# Patient Record
Sex: FEMALE | Race: WHITE | NOT HISPANIC OR LATINO
[De-identification: names, ages, dates, MRNs, and addresses within clinical notes are randomized per-mention and may not be internally consistent; named-entity substitution may affect disease eponyms.]

---

## 2017-03-16 ENCOUNTER — APPOINTMENT (OUTPATIENT)
Dept: MRI IMAGING | Facility: IMAGING CENTER | Age: 42
End: 2017-03-16

## 2017-03-17 ENCOUNTER — APPOINTMENT (OUTPATIENT)
Dept: MRI IMAGING | Facility: CLINIC | Age: 42
End: 2017-03-17

## 2017-04-18 ENCOUNTER — OTHER (OUTPATIENT)
Age: 42
End: 2017-04-18

## 2018-03-28 ENCOUNTER — APPOINTMENT (OUTPATIENT)
Dept: INFECTIOUS DISEASE | Facility: CLINIC | Age: 43
End: 2018-03-28
Payer: MEDICARE

## 2018-03-28 ENCOUNTER — LABORATORY RESULT (OUTPATIENT)
Age: 43
End: 2018-03-28

## 2018-03-28 VITALS
BODY MASS INDEX: 48.82 KG/M2 | OXYGEN SATURATION: 98 % | HEIGHT: 65 IN | DIASTOLIC BLOOD PRESSURE: 90 MMHG | HEART RATE: 90 BPM | WEIGHT: 293 LBS | TEMPERATURE: 98 F | SYSTOLIC BLOOD PRESSURE: 158 MMHG

## 2018-03-28 DIAGNOSIS — Z20.2 CONTACT WITH AND (SUSPECTED) EXPOSURE TO INFECTIONS WITH A PREDOMINANTLY SEXUAL MODE OF TRANSMISSION: ICD-10-CM

## 2018-03-28 PROCEDURE — 99204 OFFICE O/P NEW MOD 45 MIN: CPT

## 2018-03-30 LAB
ALBUMIN SERPL ELPH-MCNC: 4.1 G/DL
ALP BLD-CCNC: 75 U/L
ALT SERPL-CCNC: 24 U/L
ANION GAP SERPL CALC-SCNC: 15 MMOL/L
AST SERPL-CCNC: 22 U/L
BILIRUB SERPL-MCNC: <0.2 MG/DL
BUN SERPL-MCNC: 10 MG/DL
C TRACH RRNA SPEC QL NAA+PROBE: NOT DETECTED
CALCIUM SERPL-MCNC: 9.4 MG/DL
CHLORIDE SERPL-SCNC: 100 MMOL/L
CO2 SERPL-SCNC: 22 MMOL/L
CREAT SERPL-MCNC: 0.65 MG/DL
GLUCOSE SERPL-MCNC: 91 MG/DL
HAV IGG+IGM SER QL: NONREACTIVE
HBV CORE IGG+IGM SER QL: NONREACTIVE
HBV SURFACE AB SER QL: NONREACTIVE
HBV SURFACE AG SER QL: NONREACTIVE
HCG SERPL-MCNC: <1 MIU/ML
HCV AB SER QL: NONREACTIVE
HCV S/CO RATIO: 0.12 S/CO
HIV1+2 AB SPEC QL IA.RAPID: NONREACTIVE
N GONORRHOEA RRNA SPEC QL NAA+PROBE: NOT DETECTED
POTASSIUM SERPL-SCNC: 4.2 MMOL/L
PROT SERPL-MCNC: 7.7 G/DL
SODIUM SERPL-SCNC: 137 MMOL/L
SOURCE AMPLIFICATION: NORMAL
T PALLIDUM AB SER QL IA: NEGATIVE

## 2018-04-12 ENCOUNTER — LABORATORY RESULT (OUTPATIENT)
Age: 43
End: 2018-04-12

## 2018-04-12 LAB
25(OH)D3 SERPL-MCNC: 14.6 NG/ML
APPEARANCE: ABNORMAL
BACTERIA: ABNORMAL
BASOPHILS # BLD AUTO: 0.1 K/UL
BASOPHILS NFR BLD AUTO: 0.9 %
BILIRUBIN URINE: NEGATIVE
BLOOD URINE: NEGATIVE
CALCIUM OXALATE CRYSTALS: ABNORMAL
COLOR: YELLOW
EOSINOPHIL # BLD AUTO: 0.2 K/UL
EOSINOPHIL NFR BLD AUTO: 1.8 %
GLUCOSE QUALITATIVE U: NEGATIVE MG/DL
HCT VFR BLD CALC: 32.5 %
HGB BLD-MCNC: 9.3 G/DL
HYALINE CASTS: 0 /LPF
KETONES URINE: NEGATIVE
LEUKOCYTE ESTERASE URINE: NEGATIVE
LYMPHOCYTES # BLD AUTO: 2.14 K/UL
LYMPHOCYTES NFR BLD AUTO: 19.3 %
MAN DIFF?: NORMAL
MCHC RBC-ENTMCNC: 20.5 PG
MCHC RBC-ENTMCNC: 28.6 GM/DL
MCV RBC AUTO: 71.6 FL
MICROSCOPIC-UA: NORMAL
MONOCYTES # BLD AUTO: 0.77 K/UL
MONOCYTES NFR BLD AUTO: 7 %
NEUTROPHILS # BLD AUTO: 7.86 K/UL
NEUTROPHILS NFR BLD AUTO: 71 %
NITRITE URINE: NEGATIVE
PH URINE: 5
PLATELET # BLD AUTO: 426 K/UL
PROTEIN URINE: NEGATIVE MG/DL
RBC # BLD: 4.54 M/UL
RBC # FLD: 18.6 %
RED BLOOD CELLS URINE: 2 /HPF
SPECIFIC GRAVITY URINE: 1.04
SQUAMOUS EPITHELIAL CELLS: >27 /HPF
UROBILINOGEN URINE: NEGATIVE MG/DL
WBC # FLD AUTO: 11.07 K/UL
WHITE BLOOD CELLS URINE: 74 /HPF

## 2018-04-24 ENCOUNTER — RX RENEWAL (OUTPATIENT)
Age: 43
End: 2018-04-24

## 2018-04-25 ENCOUNTER — LABORATORY RESULT (OUTPATIENT)
Age: 43
End: 2018-04-25

## 2018-04-25 ENCOUNTER — APPOINTMENT (OUTPATIENT)
Dept: INFECTIOUS DISEASE | Facility: CLINIC | Age: 43
End: 2018-04-25
Payer: MEDICARE

## 2018-04-25 VITALS
BODY MASS INDEX: 48.82 KG/M2 | OXYGEN SATURATION: 98 % | DIASTOLIC BLOOD PRESSURE: 96 MMHG | WEIGHT: 293 LBS | HEIGHT: 65 IN | SYSTOLIC BLOOD PRESSURE: 155 MMHG | HEART RATE: 96 BPM | TEMPERATURE: 98.8 F

## 2018-04-25 DIAGNOSIS — E55.9 VITAMIN D DEFICIENCY, UNSPECIFIED: ICD-10-CM

## 2018-04-25 DIAGNOSIS — Z23 ENCOUNTER FOR IMMUNIZATION: ICD-10-CM

## 2018-04-25 DIAGNOSIS — Z34.90 ENCOUNTER FOR SUPERVISION OF NORMAL PREGNANCY, UNSPECIFIED, UNSPECIFIED TRIMESTER: ICD-10-CM

## 2018-04-25 DIAGNOSIS — Z20.2 CONTACT WITH AND (SUSPECTED) EXPOSURE TO INFECTIONS WITH A PREDOMINANTLY SEXUAL MODE OF TRANSMISSION: ICD-10-CM

## 2018-04-25 DIAGNOSIS — Z20.828 CONTACT WITH AND (SUSPECTED) EXPOSURE TO OTHER VIRAL COMMUNICABLE DISEASES: ICD-10-CM

## 2018-04-25 DIAGNOSIS — Z87.898 PERSONAL HISTORY OF OTHER SPECIFIED CONDITIONS: ICD-10-CM

## 2018-04-25 DIAGNOSIS — R89.4 ABNORMAL IMMUNOLOGICAL FINDINGS IN SPECIMENS FROM OTHER ORGANS, SYSTEMS AND TISSUES: ICD-10-CM

## 2018-04-25 DIAGNOSIS — Z32.01 ENCOUNTER FOR PREGNANCY TEST, RESULT POSITIVE: ICD-10-CM

## 2018-04-25 DIAGNOSIS — Z29.9 ENCOUNTER FOR PROPHYLACTIC MEASURES, UNSPECIFIED: ICD-10-CM

## 2018-04-25 PROCEDURE — 90636 HEP A/HEP B VACC ADULT IM: CPT

## 2018-04-25 PROCEDURE — 99214 OFFICE O/P EST MOD 30 MIN: CPT | Mod: 25

## 2018-04-25 PROCEDURE — 90471 IMMUNIZATION ADMIN: CPT

## 2018-04-25 RX ORDER — RALTEGRAVIR 400 MG/1
400 TABLET, FILM COATED ORAL
Qty: 56 | Refills: 0 | Status: DISCONTINUED | COMMUNITY
Start: 2018-03-28 | End: 2018-04-25

## 2018-04-25 RX ORDER — ONDANSETRON 8 MG/1
8 TABLET, ORALLY DISINTEGRATING ORAL
Qty: 56 | Refills: 0 | Status: DISCONTINUED | COMMUNITY
Start: 2018-03-28 | End: 2018-04-25

## 2018-04-26 LAB
ALBUMIN SERPL ELPH-MCNC: 4 G/DL
ALP BLD-CCNC: 89 U/L
ALT SERPL-CCNC: 18 U/L
ANION GAP SERPL CALC-SCNC: 17 MMOL/L
APPEARANCE: CLEAR
AST SERPL-CCNC: 26 U/L
BILIRUB SERPL-MCNC: <0.2 MG/DL
BILIRUBIN URINE: NEGATIVE
BLOOD URINE: ABNORMAL
BUN SERPL-MCNC: 13 MG/DL
CALCIUM SERPL-MCNC: 9.9 MG/DL
CHLORIDE SERPL-SCNC: 100 MMOL/L
CO2 SERPL-SCNC: 24 MMOL/L
COLOR: YELLOW
CREAT SERPL-MCNC: 0.71 MG/DL
GLUCOSE QUALITATIVE U: NEGATIVE MG/DL
GLUCOSE SERPL-MCNC: 92 MG/DL
HIV1+2 AB SPEC QL IA.RAPID: NONREACTIVE
IRON SATN MFR SERPL: 5 %
IRON SERPL-MCNC: 18 UG/DL
KETONES URINE: NEGATIVE
LEUKOCYTE ESTERASE URINE: NEGATIVE
NITRITE URINE: NEGATIVE
PH URINE: 5.5
POTASSIUM SERPL-SCNC: 4.1 MMOL/L
PROT SERPL-MCNC: 8.1 G/DL
PROTEIN URINE: NEGATIVE MG/DL
SODIUM SERPL-SCNC: 141 MMOL/L
SPECIFIC GRAVITY URINE: 1.03
T PALLIDUM AB SER QL IA: NEGATIVE
TIBC SERPL-MCNC: 378 UG/DL
UIBC SERPL-MCNC: 360 UG/DL
UROBILINOGEN URINE: NEGATIVE MG/DL

## 2018-04-27 LAB
BASOPHILS # BLD AUTO: 0.04 K/UL
BASOPHILS NFR BLD AUTO: 0.3 %
C TRACH RRNA SPEC QL NAA+PROBE: NOT DETECTED
EOSINOPHIL # BLD AUTO: 0.23 K/UL
EOSINOPHIL NFR BLD AUTO: 1.8 %
HCT VFR BLD CALC: 32.8 %
HGB BLD-MCNC: 9.3 G/DL
IMM GRANULOCYTES NFR BLD AUTO: 0.4 %
LYMPHOCYTES # BLD AUTO: 2.66 K/UL
LYMPHOCYTES NFR BLD AUTO: 20.4 %
MAN DIFF?: NORMAL
MCHC RBC-ENTMCNC: 20.6 PG
MCHC RBC-ENTMCNC: 28.4 GM/DL
MCV RBC AUTO: 72.7 FL
MONOCYTES # BLD AUTO: 1.15 K/UL
MONOCYTES NFR BLD AUTO: 8.8 %
N GONORRHOEA RRNA SPEC QL NAA+PROBE: NOT DETECTED
NEUTROPHILS # BLD AUTO: 8.92 K/UL
NEUTROPHILS NFR BLD AUTO: 68.3 %
PLATELET # BLD AUTO: 475 K/UL
RBC # BLD: 4.51 M/UL
RBC # FLD: 17.5 %
SOURCE AMPLIFICATION: NORMAL
WBC # FLD AUTO: 13.05 K/UL

## 2018-05-02 ENCOUNTER — RX RENEWAL (OUTPATIENT)
Age: 43
End: 2018-05-02

## 2018-05-25 ENCOUNTER — APPOINTMENT (OUTPATIENT)
Dept: INFECTIOUS DISEASE | Facility: CLINIC | Age: 43
End: 2018-05-25

## 2018-06-11 ENCOUNTER — APPOINTMENT (OUTPATIENT)
Dept: OBGYN | Facility: CLINIC | Age: 43
End: 2018-06-11

## 2018-06-14 ENCOUNTER — MEDICATION RENEWAL (OUTPATIENT)
Age: 43
End: 2018-06-14

## 2018-07-24 ENCOUNTER — MEDICATION RENEWAL (OUTPATIENT)
Age: 43
End: 2018-07-24

## 2018-07-25 ENCOUNTER — MEDICATION RENEWAL (OUTPATIENT)
Age: 43
End: 2018-07-25

## 2018-07-25 ENCOUNTER — APPOINTMENT (OUTPATIENT)
Dept: INFECTIOUS DISEASE | Facility: CLINIC | Age: 43
End: 2018-07-25

## 2019-04-08 ENCOUNTER — EMERGENCY (EMERGENCY)
Facility: HOSPITAL | Age: 44
LOS: 1 days | Discharge: HOME | End: 2019-04-08
Attending: EMERGENCY MEDICINE | Admitting: EMERGENCY MEDICINE
Payer: MEDICARE

## 2019-04-08 VITALS
SYSTOLIC BLOOD PRESSURE: 167 MMHG | RESPIRATION RATE: 16 BRPM | OXYGEN SATURATION: 100 % | HEART RATE: 91 BPM | TEMPERATURE: 98 F | DIASTOLIC BLOOD PRESSURE: 92 MMHG

## 2019-04-08 PROCEDURE — 99283 EMERGENCY DEPT VISIT LOW MDM: CPT | Mod: 25

## 2019-04-08 NOTE — ED ADULT TRIAGE NOTE - CHIEF COMPLAINT QUOTE
pt with dx of cellulitis to right lower arm c/o increasing swelling and redness- pt on keflex x4 days and amoxicillin x2 days from PCP- denies any pain, fevers/chills, no decreased sensation to arm or pain- PMH tachycardia, appears in NAD

## 2019-04-09 RX ADMIN — Medication 100 MILLIGRAM(S): at 02:22

## 2019-04-09 NOTE — ED PROVIDER NOTE - PMH
Hypertension--diet and weight controlled  lost 15 pounds over 1.5 months  metorrhagia/Bleeding    Morbid Obesity    Murmur    personal h/o Fatty Liver documented on testing    personal h/o Iron Deficiency Anemia    Vertigo

## 2019-04-09 NOTE — ED ADULT NURSE NOTE - NSIMPLEMENTINTERV_GEN_ALL_ED
Implemented All Universal Safety Interventions:  [place] to call system. Call bell, personal items and telephone within reach. Instruct patient to call for assistance. Room bathroom lighting operational. Non-slip footwear when patient is off stretcher. Physically safe environment: no spills, clutter or unnecessary equipment. Stretcher in lowest position, wheels locked, appropriate side rails in place.

## 2019-04-09 NOTE — ED PROVIDER NOTE - NSFOLLOWUPINSTRUCTIONS_ED_ALL_ED_FT
Please follow up with your primary care doctor after you leave the emergency department so that they can follow up and conduct more testing and treatment as they deem necessary. If you have worsening signs or symptoms of what you came in to the Emergency Department today and are not able to see your doctor, go to your nearest emergency department or return to the [facility] emergency department for further care and management.

## 2019-04-09 NOTE — ED PROVIDER NOTE - OBJECTIVE STATEMENT
44 yo F with obesity, HTN, vertigo but no immunocompromised conditions that presents with left arm cellulitis. Pt has had symptoms x 4 days, started when she saw some lesions on her left arm, possible acne vs bug bites and she popped them, since has had erythema and warmth. Rash non tender, non itching. Took left over keflex that she had and started marking her arm, redness worse so went to PMD/NP and started on Amox x 2 days, not improving but taking as Rx'd. Also started on some cream given by NP. Denies systemic symptoms including fever, chills, N/V/D, chest pain, SOB, arm apin, paresthesias. Denies any IVDU. No known bug bites but possible per pt. Has never had this before. Otherwise eating and drinking well. LMP 1 wk ago.

## 2019-04-09 NOTE — ED ADULT NURSE NOTE - OBJECTIVE STATEMENT
received pt to intake aox3 in no apparent distress c/o cellulitis to left arm x3 days from possible bug bite or pimple. pt denies any other complaints including itchiness tenderness chest pain SOB fever chills. breaths equal unlabored VSS medicated as per order discharged

## 2019-04-09 NOTE — ED PROVIDER NOTE - CLINICAL SUMMARY MEDICAL DECISION MAKING FREE TEXT BOX
44 yo F with no immunocompromised conditions that presents with left arm cellulitis on exam x 4 days, taking PO Abx but not working per pt including leftover keflex which she has since stopped then started on PO Amox x 2 days per PMD/NP. Appears well and denies any systemic symptoms. Small area of concern. Possible shingles but unlikely but consider. Will start PO abx Doxycycline and f/u with PMD. Return precautions explained including start of systemic symptoms vs worsening of erythema or pain. Pt aware. VS WNL at this time.

## 2019-04-18 ENCOUNTER — OTHER (OUTPATIENT)
Age: 44
End: 2019-04-18

## 2019-04-18 ENCOUNTER — APPOINTMENT (OUTPATIENT)
Dept: GASTROENTEROLOGY | Facility: HOSPITAL | Age: 44
End: 2019-04-18

## 2019-04-18 ENCOUNTER — LABORATORY RESULT (OUTPATIENT)
Age: 44
End: 2019-04-18

## 2019-04-18 ENCOUNTER — OUTPATIENT (OUTPATIENT)
Dept: OUTPATIENT SERVICES | Facility: HOSPITAL | Age: 44
LOS: 1 days | End: 2019-04-18

## 2019-04-18 VITALS
HEART RATE: 92 BPM | BODY MASS INDEX: 48.82 KG/M2 | WEIGHT: 293 LBS | SYSTOLIC BLOOD PRESSURE: 135 MMHG | DIASTOLIC BLOOD PRESSURE: 64 MMHG | HEIGHT: 65 IN

## 2019-04-18 DIAGNOSIS — E66.01 MORBID (SEVERE) OBESITY DUE TO EXCESS CALORIES: ICD-10-CM

## 2019-04-18 DIAGNOSIS — R12 HEARTBURN: ICD-10-CM

## 2019-04-18 DIAGNOSIS — D50.9 IRON DEFICIENCY ANEMIA, UNSPECIFIED: ICD-10-CM

## 2019-04-18 LAB
ALBUMIN SERPL ELPH-MCNC: 4.2 G/DL — SIGNIFICANT CHANGE UP (ref 3.3–5)
ALP SERPL-CCNC: 83 U/L — SIGNIFICANT CHANGE UP (ref 40–120)
ALT FLD-CCNC: 30 U/L — SIGNIFICANT CHANGE UP (ref 4–33)
ANION GAP SERPL CALC-SCNC: 13 MMO/L — SIGNIFICANT CHANGE UP (ref 7–14)
AST SERPL-CCNC: 41 U/L — HIGH (ref 4–32)
BASOPHILS # BLD AUTO: 0.07 K/UL — SIGNIFICANT CHANGE UP (ref 0–0.2)
BASOPHILS NFR BLD AUTO: 0.7 % — SIGNIFICANT CHANGE UP (ref 0–2)
BILIRUB SERPL-MCNC: < 0.2 MG/DL — LOW (ref 0.2–1.2)
BUN SERPL-MCNC: 9 MG/DL — SIGNIFICANT CHANGE UP (ref 7–23)
CALCIUM SERPL-MCNC: 9.3 MG/DL — SIGNIFICANT CHANGE UP (ref 8.4–10.5)
CHLORIDE SERPL-SCNC: 100 MMOL/L — SIGNIFICANT CHANGE UP (ref 98–107)
CO2 SERPL-SCNC: 26 MMOL/L — SIGNIFICANT CHANGE UP (ref 22–31)
CREAT SERPL-MCNC: 0.62 MG/DL — SIGNIFICANT CHANGE UP (ref 0.5–1.3)
EOSINOPHIL # BLD AUTO: 0.19 K/UL — SIGNIFICANT CHANGE UP (ref 0–0.5)
EOSINOPHIL NFR BLD AUTO: 1.8 % — SIGNIFICANT CHANGE UP (ref 0–6)
FERRITIN SERPL-MCNC: 11.93 NG/ML — LOW (ref 15–150)
FOLATE SERPL-MCNC: 6.3 NG/ML — SIGNIFICANT CHANGE UP (ref 4.7–20)
GLUCOSE SERPL-MCNC: 93 MG/DL — SIGNIFICANT CHANGE UP (ref 70–99)
HBV SURFACE AG SER-ACNC: NEGATIVE — SIGNIFICANT CHANGE UP
HCT VFR BLD CALC: 35.4 % — SIGNIFICANT CHANGE UP (ref 34.5–45)
HGB BLD-MCNC: 10.3 G/DL — LOW (ref 11.5–15.5)
IGA FLD-MCNC: 420 MG/DL — HIGH (ref 70–400)
IMM GRANULOCYTES NFR BLD AUTO: 0.4 % — SIGNIFICANT CHANGE UP (ref 0–1.5)
IRON SATN MFR SERPL: 41 UG/DL — SIGNIFICANT CHANGE UP (ref 30–160)
IRON SATN MFR SERPL: 436 UG/DL — SIGNIFICANT CHANGE UP (ref 140–530)
LYMPHOCYTES # BLD AUTO: 2.23 K/UL — SIGNIFICANT CHANGE UP (ref 1–3.3)
LYMPHOCYTES # BLD AUTO: 21.3 % — SIGNIFICANT CHANGE UP (ref 13–44)
MCHC RBC-ENTMCNC: 22 PG — LOW (ref 27–34)
MCHC RBC-ENTMCNC: 29.1 % — LOW (ref 32–36)
MCV RBC AUTO: 75.5 FL — LOW (ref 80–100)
MONOCYTES # BLD AUTO: 0.7 K/UL — SIGNIFICANT CHANGE UP (ref 0–0.9)
MONOCYTES NFR BLD AUTO: 6.7 % — SIGNIFICANT CHANGE UP (ref 2–14)
NEUTROPHILS # BLD AUTO: 7.22 K/UL — SIGNIFICANT CHANGE UP (ref 1.8–7.4)
NEUTROPHILS NFR BLD AUTO: 69.1 % — SIGNIFICANT CHANGE UP (ref 43–77)
NRBC # FLD: 0 K/UL — SIGNIFICANT CHANGE UP (ref 0–0)
PLATELET # BLD AUTO: 332 K/UL — SIGNIFICANT CHANGE UP (ref 150–400)
PMV BLD: 10.6 FL — SIGNIFICANT CHANGE UP (ref 7–13)
POTASSIUM SERPL-MCNC: 4.1 MMOL/L — SIGNIFICANT CHANGE UP (ref 3.5–5.3)
POTASSIUM SERPL-SCNC: 4.1 MMOL/L — SIGNIFICANT CHANGE UP (ref 3.5–5.3)
PROT SERPL-MCNC: 8.1 G/DL — SIGNIFICANT CHANGE UP (ref 6–8.3)
RBC # BLD: 4.69 M/UL — SIGNIFICANT CHANGE UP (ref 3.8–5.2)
RBC # FLD: 17.5 % — HIGH (ref 10.3–14.5)
SODIUM SERPL-SCNC: 139 MMOL/L — SIGNIFICANT CHANGE UP (ref 135–145)
TSH SERPL-MCNC: 2.83 UIU/ML — SIGNIFICANT CHANGE UP (ref 0.27–4.2)
UIBC SERPL-MCNC: 395 UG/DL — HIGH (ref 110–370)
VIT B12 SERPL-MCNC: 370 PG/ML — SIGNIFICANT CHANGE UP (ref 200–900)
WBC # BLD: 10.45 K/UL — SIGNIFICANT CHANGE UP (ref 3.8–10.5)
WBC # FLD AUTO: 10.45 K/UL — SIGNIFICANT CHANGE UP (ref 3.8–10.5)

## 2019-04-18 NOTE — OB HISTORY
[Definite:  ___ (Date)] : the last menstrual period was [unfilled] [Normal Amount/Duration] : was of a normal amount and duration

## 2019-04-18 NOTE — REVIEW OF SYSTEMS
[Fever] : no fever [Chills] : no chills [Feeling Poorly] : not feeling poorly [Eye Pain] : no eye pain [Feeling Tired] : not feeling tired [Earache] : no earache [Heart Rate Is Slow] : the heart rate was not slow [Chest Pain] : no chest pain [Heart Rate Is Fast] : the heart rate was not fast [Palpitations] : no palpitations [Shortness Of Breath] : no shortness of breath [Cough] : no cough [Wheezing] : no wheezing [SOB on Exertion] : no shortness of breath during exertion [Vomiting] : no vomiting [Abdominal Pain] : no abdominal pain [Constipation] : no constipation [Diarrhea] : no diarrhea [Dysuria] : no dysuria [Arthralgias] : no arthralgias [Skin Lesions] : no skin lesions [Dizziness] : no dizziness [Anxiety] : no anxiety [Depression] : no depression [Proptosis] : no proptosis [Easy Bleeding] : no tendency for easy bleeding

## 2019-04-18 NOTE — ASSESSMENT
[FreeTextEntry1] : Impression:\par 1)Microcytic anemia- concern for iron deficiency anemia; patient is till currently menstruating but with drop in Hgb from 12 to 9 over the past 6 years. Concern for possible occult GI bleeding 2/2 underlying gastric or colonic malignancy, angiectasia, celiac disease; additionally could consider thyroid disorder, thalassemia, hereditary spherocytosis, anemia of inflammation. \par 2)Morbid Obesity (BMI 54)\par 3)Heartburn- suspect GERD in setting of morbid obesity; additional etiologies could include peptic ulcer disease, HP gastropathy, gastroparesis. \par \par Recommendations:\par -discussed role of endoscopic evaluation at this time to explore microcytic anemia given drop in Hgb over the last 6 years. The patient understands risks and benefits to upper endoscopy and colonoscopy in addition to potential capsule endoscopy, but is reluctant to undergo colonoscopy at this time. \par -will plan for EGD for now\par Risks, benefits, and alternatives described to patient in detail including but not limited to risks of bleeding, infection, perforation and missed lesions. The patient expresses full understanding and is agreeable to the procedure.\par -f/u blood work as ordered\par -check fecal occult blood test\par -given heartburn patient may benefit from RNY bypass as opposed to gastric sleeve given sleeve potentially worsens reflux. \par Discussed dietary and lifestyle modification with patient to promote weight loss\par -Anti-reflux diet discussed with patient.\par -return to clinic following procedures to discuss results

## 2019-04-18 NOTE — HISTORY OF PRESENT ILLNESS
[Heartburn] : stable heartburn [Vomiting] : denies vomiting [Nausea] : denies nausea [Constipation] : denies constipation [Diarrhea] : denies diarrhea [Abdominal Pain] : denies abdominal pain [Yellow Skin Or Eyes (Jaundice)] : denies jaundice [Abdominal Swelling] : denies abdominal swelling [de-identified] : 43F with morbid obesity (BMI 54), Vitamin D deficiency, presenting for initial evaluation. The patient is currently being evaluated by [facility] for bariatric surgery. She was recently diagnosed with NICOLÁS and is scheduled for receive CPAP machine at home. She denies any abdominal pain, nausea or vomiting. She notes intermittent heartburn related to eating certain foods, that is relieved with tums. She denies dysphagia, odynophagia, regurgitation or globus. She has regular BM's- brown in color, 1-2 times daily. She has not recent changes in stool frequency or caliber. She has never had a colonoscopy or upper endoscopy. She reports a history of "anemia" for which she was followed by a hematologist since she was young. She had blood work performed in 2012 which showed Hgb of 12, recent blood work showed Hgb of 9 with low MCV. She is currently on iron supplementation which she stopped 2 weeks ago in setting of starting amoxicillin for left arm abrasion. \par She is currently still menstruating; her last period started 2 days ago. She states her periods of regular without any excessive bleeding. \par \par HBsAg negative, anti-HBc negative, anti-HBs negative, HCV AB negative\par The patient received 2 of 3 Twinrix vaccinations 4/2018 and 5/2018. No documented vaccinations since then. \par RECENT LABS:\par 4/2018: Hgb 9.3, MCV 72, \par Iron 18, TIBC 378, Iron sat 5%

## 2019-04-18 NOTE — REASON FOR VISIT
[Initial Evaluation] : an initial evaluation [FreeTextEntry1] : Evaluation prior to bariatric surgery

## 2019-04-18 NOTE — PHYSICAL EXAM
[General Appearance - Alert] : alert [General Appearance - In No Acute Distress] : in no acute distress [Sclera] : the sclera and conjunctiva were normal [Examination Of The Oral Cavity] : the lips and gums were normal [Nasal Cavity] : the nasal mucosa and septum were normal [Outer Ear] : the ears and nose were normal in appearance [Oropharynx] : the oropharynx was normal [Neck Appearance] : the appearance of the neck was normal [Exaggerated Use Of Accessory Muscles For Inspiration] : no accessory muscle use [Heart Sounds] : normal S1 and S2 [Edema] : there was no peripheral edema [Abdomen Soft] : soft [Bowel Sounds] : normal bowel sounds [Abdomen Tenderness] : non-tender [Cervical Lymph Nodes Enlarged Posterior Bilaterally] : posterior cervical [Cervical Lymph Nodes Enlarged Anterior Bilaterally] : anterior cervical [Supraclavicular Lymph Nodes Enlarged Bilaterally] : supraclavicular [No CVA Tenderness] : no ~M costovertebral angle tenderness [Abnormal Walk] : normal gait [Skin Color & Pigmentation] : normal skin color and pigmentation [] : no rash [No Focal Deficits] : no focal deficits [Oriented To Time, Place, And Person] : oriented to person, place, and time [Affect] : the affect was normal [Impaired Insight] : insight and judgment were intact [Mood] : the mood was normal [FreeTextEntry1] : Morbid obesity

## 2019-04-19 LAB — SPECIMEN SOURCE: SIGNIFICANT CHANGE UP

## 2019-04-22 LAB
TTG IGA SER-ACNC: <1.2 U/ML — SIGNIFICANT CHANGE UP
TTG IGG SER-ACNC: 1.8 U/ML — SIGNIFICANT CHANGE UP

## 2019-04-23 LAB — BACTERIA BLD CULT: SIGNIFICANT CHANGE UP

## 2019-05-08 ENCOUNTER — RESULT REVIEW (OUTPATIENT)
Age: 44
End: 2019-05-08

## 2019-05-08 ENCOUNTER — OUTPATIENT (OUTPATIENT)
Dept: OUTPATIENT SERVICES | Facility: HOSPITAL | Age: 44
LOS: 1 days | Discharge: HOME | End: 2019-05-08
Payer: MEDICARE

## 2019-05-08 DIAGNOSIS — E66.01 MORBID (SEVERE) OBESITY DUE TO EXCESS CALORIES: ICD-10-CM

## 2019-05-08 LAB — HCG UR QL: NEGATIVE — SIGNIFICANT CHANGE UP

## 2019-05-08 PROCEDURE — 88312 SPECIAL STAINS GROUP 1: CPT | Mod: 26

## 2019-05-08 PROCEDURE — 43239 EGD BIOPSY SINGLE/MULTIPLE: CPT | Mod: GC

## 2019-05-08 PROCEDURE — 88305 TISSUE EXAM BY PATHOLOGIST: CPT | Mod: 26

## 2019-05-09 LAB — SURGICAL PATHOLOGY STUDY: SIGNIFICANT CHANGE UP

## 2019-05-13 DIAGNOSIS — A04.8 OTHER SPECIFIED BACTERIAL INTESTINAL INFECTIONS: ICD-10-CM

## 2019-05-16 ENCOUNTER — OTHER (OUTPATIENT)
Age: 44
End: 2019-05-16

## 2019-05-28 ENCOUNTER — OTHER (OUTPATIENT)
Age: 44
End: 2019-05-28

## 2019-05-30 ENCOUNTER — APPOINTMENT (OUTPATIENT)
Dept: GASTROENTEROLOGY | Facility: CLINIC | Age: 44
End: 2019-05-30

## 2019-05-30 ENCOUNTER — OUTPATIENT (OUTPATIENT)
Dept: OUTPATIENT SERVICES | Facility: HOSPITAL | Age: 44
LOS: 1 days | End: 2019-05-30

## 2019-05-30 VITALS
WEIGHT: 293 LBS | HEIGHT: 65 IN | OXYGEN SATURATION: 97 % | HEART RATE: 96 BPM | BODY MASS INDEX: 48.82 KG/M2 | SYSTOLIC BLOOD PRESSURE: 124 MMHG | DIASTOLIC BLOOD PRESSURE: 78 MMHG

## 2019-05-30 NOTE — PHYSICAL EXAM
[General Appearance - Alert] : alert [General Appearance - In No Acute Distress] : in no acute distress [Auscultation Breath Sounds / Voice Sounds] : lungs were clear to auscultation bilaterally [Bowel Sounds] : normal bowel sounds [Abdomen Soft] : soft [Abdomen Tenderness] : non-tender [Abdomen Mass (___ Cm)] : no abdominal mass palpated [Abnormal Walk] : normal gait [Nail Clubbing] : no clubbing  or cyanosis of the fingernails [Musculoskeletal - Swelling] : no joint swelling seen [Motor Tone] : muscle strength and tone were normal [Skin Color & Pigmentation] : normal skin color and pigmentation [Skin Turgor] : normal skin turgor [] : no rash [Occult Blood Positive] : stool was negative for occult blood

## 2019-05-30 NOTE — ASSESSMENT
[FreeTextEntry1] : Impression:\par 1)Microcytic anemia- concern for iron deficiency anemia; patient is till currently menstruating but with drop in Hgb from 12 to 9 over the past 6 years. Concern for possible occult GI bleeding 2/2 underlying g colonic malignancy, angiectasia,additionally could consider  thalassemia, hereditary spherocytosis.\par 2)Morbid Obesity (BMI 54)\par 3)Heartburn- suspect GERD in setting of morbid obesity\par 4) H Pylori - pending treatment\par \par Recommendations:\par - repeat CBC\par - recommended triple therapy for H. Pylori\par - Recommend stool Ag for H pylori in 2 months to evaluate for eradication\par - Recommend repeat upper endoscopy in 1 year to follow up on intestinal metaplasia \par - patient still does not want colonoscopy but this was discussed again\par -check fecal occult blood test\par -given heartburn patient may benefit from RNY bypass as opposed to gastric sleeve given sleeve potentially worsens reflux. \par -Discussed dietary and lifestyle modification with patient to promote weight loss\par -Anti-reflux diet discussed with patient.\par -return to clinic following procedures to discuss results.

## 2019-05-30 NOTE — HISTORY OF PRESENT ILLNESS
[de-identified] : 43F with morbid obesity (BMI 54), Vitamin D deficiency, presenting for follow up with visit with Dr. Li in April 2019 initial evaluation fpr GERD.  Of note the patient is currently being evaluated by [facility] for bariatric surgery. At her last visit with Dr. Li she denied any abdominal pain, nausea or vomiting. She noted intermittent heartburn related to eating certain foods, that is relieved with tums. She denied dysphagia, odynophagia, regurgitation or globus. \par \par She has regular BM's- brown in color, 1-2 times daily. She has not recent changes in stool frequency or caliber. She has never had a colonoscopy or upper endoscopy. She reports a history of "anemia" for which she was followed by a hematologist since she was young. She had blood work performed in 2012 which showed Hgb of 12, recent blood work showed Hgb of 9 with low MCV. She is currently on iron supplementation which she stopped 2 weeks ago in setting of starting amoxicillin for left arm abrasion. \par She is currently still menstruating states her periods of regular without any excessive bleeding. \par \par HBsAg negative, anti-HBc negative, anti-HBs negative, HCV AB negative\par The patient received 2 of 3 Twinrix vaccinations 4/2018 and 5/2018. No documented vaccinations since then.   \par \par After her last visit it was recommened she receive EGD/Colon but patient deferred colonoscopy.  EGD showed chronic Gastritis and waspositive for H.Pyori and was started on triple therapy on 5/8.  FOBT and blood work ordered: ttg negative, hep B surface negative, foalte/b12 normal, quantatative IgA slightly elevated. Ferritin low, iron bind capacity high, TSH normal, CMP normal, iron normal, hemoglobin 10. Patient did not take therapy because she was afraid of side effects but now is willing after discussion.\par \par RECENT LABS:\par 4/2018: Hgb 9.3, MCV 72, \par Iron 18, TIBC 378, Iron sat 5%. \par

## 2019-06-06 ENCOUNTER — OTHER (OUTPATIENT)
Age: 44
End: 2019-06-06

## 2019-06-11 ENCOUNTER — MOBILE ON CALL (OUTPATIENT)
Age: 44
End: 2019-06-11

## 2019-07-11 ENCOUNTER — OTHER (OUTPATIENT)
Age: 44
End: 2019-07-11

## 2019-07-12 ENCOUNTER — CLINICAL ADVICE (OUTPATIENT)
Age: 44
End: 2019-07-12

## 2019-08-29 ENCOUNTER — APPOINTMENT (OUTPATIENT)
Dept: PEDIATRIC ALLERGY IMMUNOLOGY | Facility: CLINIC | Age: 44
End: 2019-08-29
Payer: MEDICARE

## 2019-08-29 VITALS
HEIGHT: 65 IN | DIASTOLIC BLOOD PRESSURE: 82 MMHG | HEART RATE: 98 BPM | BODY MASS INDEX: 48.82 KG/M2 | SYSTOLIC BLOOD PRESSURE: 137 MMHG | WEIGHT: 293 LBS | OXYGEN SATURATION: 97 %

## 2019-08-29 DIAGNOSIS — L50.2 URTICARIA DUE TO COLD AND HEAT: ICD-10-CM

## 2019-08-29 DIAGNOSIS — T50.905A ADVERSE EFFECT OF UNSPECIFIED DRUGS, MEDICAMENTS AND BIOLOGICAL SUBSTANCES, INITIAL ENCOUNTER: ICD-10-CM

## 2019-08-29 DIAGNOSIS — Z01.89 ENCOUNTER FOR OTHER SPECIFIED SPECIAL EXAMINATIONS: ICD-10-CM

## 2019-08-29 PROCEDURE — 99204 OFFICE O/P NEW MOD 45 MIN: CPT

## 2019-08-29 RX ORDER — CLARITHROMYCIN 500 MG/1
500 TABLET, FILM COATED ORAL
Qty: 28 | Refills: 0 | Status: COMPLETED | COMMUNITY
Start: 2019-05-13 | End: 2019-07-24

## 2019-08-29 RX ORDER — TETRACYCLINE HYDROCHLORIDE 500 MG/1
500 CAPSULE ORAL EVERY 6 HOURS
Qty: 60 | Refills: 0 | Status: COMPLETED | COMMUNITY
Start: 2019-06-11 | End: 2019-07-29

## 2019-08-29 RX ORDER — OMEPRAZOLE 40 MG/1
40 CAPSULE, DELAYED RELEASE ORAL
Qty: 60 | Refills: 3 | Status: COMPLETED | COMMUNITY
Start: 2019-05-13 | End: 2019-07-24

## 2019-08-29 RX ORDER — BISMUTH SUBSALICYLATE 262 MG/1
262 TABLET, CHEWABLE ORAL 4 TIMES DAILY
Qty: 56 | Refills: 0 | Status: COMPLETED | COMMUNITY
Start: 2019-06-11 | End: 2019-07-24

## 2019-08-29 RX ORDER — MECLIZINE HYDROCHLORIDE 25 MG/1
25 TABLET ORAL
Qty: 7 | Refills: 0 | Status: COMPLETED | COMMUNITY
Start: 2017-06-21 | End: 2017-08-29

## 2019-08-29 RX ORDER — METRONIDAZOLE 250 MG/1
250 TABLET ORAL EVERY 6 HOURS
Qty: 60 | Refills: 0 | Status: COMPLETED | COMMUNITY
Start: 2019-06-11 | End: 2019-07-29

## 2019-08-29 RX ORDER — AMOXICILLIN 500 MG/1
500 CAPSULE ORAL TWICE DAILY
Qty: 56 | Refills: 0 | Status: COMPLETED | COMMUNITY
Start: 2019-05-13 | End: 2019-08-29

## 2019-08-29 NOTE — SOCIAL HISTORY
[Living Area] : in living area [Cat] : cat [FreeTextEntry2] : stay at home mom [Bedroom] : not in the bedroom [Smokers in Household] : there are no smokers in the home

## 2019-08-29 NOTE — CONSULT LETTER
[Dear  ___] : Dear  [unfilled], [Consult Letter:] : I had the pleasure of evaluating your patient, [unfilled]. [Please see my note below.] : Please see my note below. [Consult Closing:] : Thank you very much for allowing me to participate in the care of this patient.  If you have any questions, please do not hesitate to contact me. [Sincerely,] : Sincerely, [DrPamela  ___] : Dr. HENDERSON [FreeTextEntry2] : Dr. Anthony Plascencia [FreeTextEntry3] : Saniya Leon MD\par Attending Physician, Division of Allergy and Immunology\par , Departments of Medicine and Pediatrics\par [employer] [facility]\par Rachelle Chow [facility] \par [facility]

## 2019-08-29 NOTE — HISTORY OF PRESENT ILLNESS
[Asthma] : asthma [Allergic Rhinitis] : allergic rhinitis [Eczematous rashes] : eczematous rashes [Venom Reactions] : venom reactions [Food Allergies] : food allergies [de-identified] : 43 year old female presents with h/o urticaria and adverse drug reactions:\par \par Urticaria:\par Patient has noticed that she develops hives with exposure to cold, either during winter or with the AC on for the past 5 years. She also feels itchy with cold exposure. Last year after going swimming, she reportedly developed itching of the skin with red blotches, dizzy and had palpitations. She got out of the pool and her symptoms self resolved.\par Denies h/o urticaria without cold exposure.\par She reports that she also got red blotches after cold water from car wash splashed on her skin during last winter. No h/o joint pain/joint swelling or angioedema. \par She is also considering surgery for gastric bypass by Dr. Anthony Plascencia at [place].\par \par Adverse drug reactions:\par Although contrast media is listed as an allergy, patient reports that she had contrast media listed in her allergy list for preference reasons and anxiety for adverse reactions. She has never had contrast before.\par Codeine- h/o itchy skin 1999 post dental procedure. No associated symptoms of angioedema, hives, respiratory and/or gastrointestinal complaints. \par Dramamine- felt "jumpy", senses were heightened.\par Band-aid - h/o skin redness lasting 2 weeks. No reaction with blowing balloons or wearing latex gloves.

## 2019-08-29 NOTE — PHYSICAL EXAM
[Alert] : alert [Well Nourished] : well nourished [Healthy Appearance] : healthy appearance [No Acute Distress] : no acute distress [Well Developed] : well developed [Normal Pupil & Iris Size/Symmetry] : normal pupil and iris size and symmetry [No Discharge] : no discharge [No Photophobia] : no photophobia [Sclera Not Icteric] : sclera not icteric [Normal TMs] : both tympanic membranes were normal [Normal Nasal Mucosa] : the nasal mucosa was normal [Normal Lips/Tongue] : the lips and tongue were normal [Normal Outer Ear/Nose] : the ears and nose were normal in appearance [Normal Tonsils] : normal tonsils [No Thrush] : no thrush [Normal Dentition] : normal dentition [No Oral Lesions or Ulcers] : no oral lesions or ulcers [No Neck Mass] : no neck mass was observed [No LAD] : no lymphadenopathy [Supple] : the neck was supple [Normal Rate and Effort] : normal respiratory rhythm and effort [No Crackles] : no crackles [No Retractions] : no retractions [Bilateral Audible Breath Sounds] : bilateral audible breath sounds [Normal Rate] : heart rate was normal  [Normal S1, S2] : normal S1 and S2 [No murmur] : no murmur [Regular Rhythm] : with a regular rhythm [Soft] : abdomen soft [Not Distended] : not distended [Normal Cervical Lymph Nodes] : cervical [Skin Intact] : skin intact  [No Rash] : no rash [No Skin Lesions] : no skin lesions [No Cyanosis] : no cyanosis [Normal Mood] : mood was normal [Normal Affect] : affect was normal [Alert, Awake, Oriented as Age-Appropriate] : alert, awake, oriented as age appropriate [Conjunctival Erythema] : no conjunctival erythema [Boggy Nasal Turbinates] : no boggy and/or pale nasal turbinates [Pharyngeal erythema] : no pharyngeal erythema [Exudate] : no exudate [Posterior Pharyngeal Cobblestoning] : no posterior pharyngeal cobblestoning [Clear Rhinorrhea] : no clear rhinorrhea was seen [Wheezing] : no wheezing was heard [Eczematous Patches] : no eczematous patches [Xerosis] : no xerosis

## 2019-08-29 NOTE — REASON FOR VISIT
[Initial Consultation] : an initial consultation for [Parent] : parent [FreeTextEntry2] : cold induced urticaria

## 2019-09-05 ENCOUNTER — APPOINTMENT (OUTPATIENT)
Dept: GASTROENTEROLOGY | Facility: CLINIC | Age: 44
End: 2019-09-05

## 2020-03-19 ENCOUNTER — APPOINTMENT (OUTPATIENT)
Dept: CARDIOLOGY | Facility: CLINIC | Age: 45
End: 2020-03-19

## 2020-03-21 ENCOUNTER — TRANSCRIPTION ENCOUNTER (OUTPATIENT)
Age: 45
End: 2020-03-21

## 2020-03-23 ENCOUNTER — APPOINTMENT (OUTPATIENT)
Dept: OBGYN | Facility: CLINIC | Age: 45
End: 2020-03-23

## 2020-10-08 ENCOUNTER — APPOINTMENT (OUTPATIENT)
Dept: OBGYN | Facility: CLINIC | Age: 45
End: 2020-10-08
Payer: MEDICARE

## 2020-10-08 ENCOUNTER — APPOINTMENT (OUTPATIENT)
Age: 45
End: 2020-10-08
Payer: MEDICARE

## 2020-10-08 ENCOUNTER — ASOB RESULT (OUTPATIENT)
Age: 45
End: 2020-10-08

## 2020-10-08 VITALS
DIASTOLIC BLOOD PRESSURE: 87 MMHG | TEMPERATURE: 97.4 F | HEART RATE: 89 BPM | BODY MASS INDEX: 48.82 KG/M2 | SYSTOLIC BLOOD PRESSURE: 151 MMHG | HEIGHT: 65 IN | WEIGHT: 293 LBS

## 2020-10-08 LAB
HCG UR QL: NEGATIVE
QUALITY CONTROL: YES

## 2020-10-08 PROCEDURE — 99203 OFFICE O/P NEW LOW 30 MIN: CPT

## 2020-10-08 PROCEDURE — 76830 TRANSVAGINAL US NON-OB: CPT

## 2020-10-08 NOTE — REVIEW OF SYSTEMS
[Fever] : no fever [Chills] : no chills [Fatigue] : no fatigue [Poor Appetite] : appetite not poor [Night Sweats] : no night sweats [Dry Eyes] : no dry eyes [Eye Discharge] : no eye discharge [Sight Problems] : no sight problems [Dec Hearing] : no decreased hearing [Nasal Discharge] : no nasal discharge [Sore Throat] : no sore throat [Dyspnea] : no dyspnea [Cough] : no cough [SOB on Exertion] : no shortness of breath on exertion [Chest Pain] : no chest pain [Palpitations] : no palpitations [Lower Ext Edema] : no lower extremity edema [Abdominal Pain] : no abdominal pain [Constipation] : no constipation [Diarrhea] : diarrhea [Vomiting] : no vomiting [Melena] : no melena [Bloating] : no bloating [Urgency] : no urgency [Frequency] : no frequency [Incontinence] : no incontinence [Urethral Discharge] : no urethral discharge [Abn Vaginal bleeding] : no abnormal vaginal bleeding [CVA Pain] : no CVA pain [Genital Rash/Irritation] : no genital rash/irritation [Breast Pain] : no breast pain [Breast Lump] : no breast lump [Nipple Changes] : no nipple changes [Skin Lesion on Breast] : no skin lesion on breast [Skin Rash] : no skin rash [Mole Changes] : no mole changes [Arthralgias] : no arthralgias [Myalgias] : no myalgias [Back Pain] : no back pain [Headache] : no headache [Dizziness] : no dizziness [Fainting] : no fainting [Anxiety] : no anxiety [Depression] : no depression [Sleep Disturbances] : no sleep disturbances [PMS/PMDD Symptoms] : no PMS/PMDD symptoms [Deepening Voice] : no deepening voice [Hot Flashes] : no hot flashes [Easy Bleeding] : no easy bleeding [Easy Bruising] : no easy bruising [Swollen Glands] : no swollen glands [Nose Bleeds] : no nose bleeds

## 2020-10-13 LAB
C TRACH RRNA SPEC QL NAA+PROBE: NOT DETECTED
CYTOLOGY CVX/VAG DOC THIN PREP: ABNORMAL
FSH SERPL-MCNC: 6.2 IU/L
HBV SURFACE AG SER QL: NONREACTIVE
HIV1+2 AB SPEC QL IA.RAPID: NONREACTIVE
HPV HIGH+LOW RISK DNA PNL CVX: NOT DETECTED
N GONORRHOEA RRNA SPEC QL NAA+PROBE: NOT DETECTED
PROLACTIN SERPL-MCNC: 7.4 NG/ML
SOURCE AMPLIFICATION: NORMAL
T PALLIDUM AB SER QL IA: NEGATIVE
TSH SERPL-ACNC: 3.56 UIU/ML

## 2020-11-23 ENCOUNTER — APPOINTMENT (OUTPATIENT)
Age: 45
End: 2020-11-23

## 2020-12-03 RX ORDER — METRONIDAZOLE 500 MG/1
500 TABLET ORAL
Qty: 4 | Refills: 0 | Status: DISCONTINUED | COMMUNITY
Start: 2020-10-13 | End: 2020-12-03

## 2020-12-03 RX ORDER — MEDROXYPROGESTERONE ACETATE 10 MG/1
10 TABLET ORAL DAILY
Qty: 10 | Refills: 0 | Status: DISCONTINUED | COMMUNITY
Start: 2020-10-08 | End: 2020-12-03

## 2020-12-03 RX ORDER — CETIRIZINE HYDROCHLORIDE 10 MG/1
10 TABLET, COATED ORAL
Qty: 1 | Refills: 2 | Status: DISCONTINUED | COMMUNITY
Start: 2019-08-29 | End: 2020-12-03

## 2020-12-03 RX ORDER — EMTRICITABINE AND TENOFOVIR DISOPROXIL FUMARATE 200; 300 MG/1; MG/1
200-300 TABLET, FILM COATED ORAL
Qty: 30 | Refills: 0 | Status: DISCONTINUED | COMMUNITY
Start: 2018-03-28 | End: 2020-12-03

## 2020-12-10 ENCOUNTER — NON-APPOINTMENT (OUTPATIENT)
Age: 45
End: 2020-12-10

## 2020-12-10 ENCOUNTER — LABORATORY RESULT (OUTPATIENT)
Age: 45
End: 2020-12-10

## 2020-12-10 ENCOUNTER — APPOINTMENT (OUTPATIENT)
Dept: CARDIOLOGY | Facility: CLINIC | Age: 45
End: 2020-12-10
Payer: MEDICARE

## 2020-12-10 VITALS
WEIGHT: 293 LBS | DIASTOLIC BLOOD PRESSURE: 83 MMHG | HEIGHT: 65 IN | OXYGEN SATURATION: 99 % | SYSTOLIC BLOOD PRESSURE: 120 MMHG | TEMPERATURE: 98.1 F | HEART RATE: 80 BPM | BODY MASS INDEX: 48.82 KG/M2

## 2020-12-10 PROCEDURE — 99072 ADDL SUPL MATRL&STAF TM PHE: CPT

## 2020-12-10 PROCEDURE — 99205 OFFICE O/P NEW HI 60 MIN: CPT

## 2020-12-10 PROCEDURE — 93000 ELECTROCARDIOGRAM COMPLETE: CPT

## 2020-12-11 LAB — 25(OH)D3 SERPL-MCNC: 17.7 NG/ML

## 2020-12-30 ENCOUNTER — NON-APPOINTMENT (OUTPATIENT)
Age: 45
End: 2020-12-30

## 2021-01-04 ENCOUNTER — TRANSCRIPTION ENCOUNTER (OUTPATIENT)
Age: 46
End: 2021-01-04

## 2021-01-04 ENCOUNTER — NON-APPOINTMENT (OUTPATIENT)
Age: 46
End: 2021-01-04

## 2021-01-05 ENCOUNTER — NON-APPOINTMENT (OUTPATIENT)
Age: 46
End: 2021-01-05

## 2021-01-14 ENCOUNTER — EMERGENCY (EMERGENCY)
Facility: HOSPITAL | Age: 46
LOS: 1 days | Discharge: HOME | End: 2021-01-14
Attending: EMERGENCY MEDICINE | Admitting: EMERGENCY MEDICINE
Payer: MEDICAID

## 2021-01-14 VITALS
TEMPERATURE: 98 F | RESPIRATION RATE: 16 BRPM | OXYGEN SATURATION: 100 % | HEART RATE: 95 BPM | DIASTOLIC BLOOD PRESSURE: 108 MMHG | SYSTOLIC BLOOD PRESSURE: 166 MMHG

## 2021-01-14 VITALS
HEIGHT: 65 IN | RESPIRATION RATE: 16 BRPM | SYSTOLIC BLOOD PRESSURE: 151 MMHG | DIASTOLIC BLOOD PRESSURE: 82 MMHG | HEART RATE: 110 BPM | OXYGEN SATURATION: 100 %

## 2021-01-14 PROCEDURE — 99283 EMERGENCY DEPT VISIT LOW MDM: CPT

## 2021-01-14 RX ORDER — ACETAMINOPHEN 500 MG
975 TABLET ORAL ONCE
Refills: 0 | Status: COMPLETED | OUTPATIENT
Start: 2021-01-14 | End: 2021-01-14

## 2021-01-14 RX ORDER — CEPHALEXIN 500 MG
500 CAPSULE ORAL ONCE
Refills: 0 | Status: COMPLETED | OUTPATIENT
Start: 2021-01-14 | End: 2021-01-14

## 2021-01-14 RX ORDER — CEPHALEXIN 500 MG
1 CAPSULE ORAL
Qty: 20 | Refills: 0
Start: 2021-01-14 | End: 2021-01-18

## 2021-01-14 RX ADMIN — Medication 975 MILLIGRAM(S): at 21:41

## 2021-01-14 RX ADMIN — Medication 500 MILLIGRAM(S): at 21:41

## 2021-01-14 NOTE — ED PROVIDER NOTE - PATIENT PORTAL LINK FT
You can access the FollowMyHealth Patient Portal offered by [facility] by registering at the following website: [email or web address]. By joining [employer]’s FollowMyHealth portal, you will also be able to view your health information using other applications (apps) compatible with our system.

## 2021-01-14 NOTE — ED PROVIDER NOTE - OBJECTIVE STATEMENT
Attending/Hung: 44 yo F w/ h/o HTN, morbid obesity, p/w RUE erythema. Pt repots ~ 5 days ago of Rt hand 2nd finger paronychia which was drained at an urgent care center. She noted some nonpruritic erythema. Two days later started on Abx with noted improvement of erythema (Keflex 500 mg BID). Pt denies fever/chills, weakness or lightheadedness.

## 2021-01-14 NOTE — ED ADULT NURSE NOTE - OBJECTIVE STATEMENT
Pt presents to intake 10c, alert&orientedx4, ambulatory, pmhx HTN and obesity, coming to ED for evaluation of redness and swelling from right pointer finger to right arm above the elbow x5days. Pt presented with redness to the arm, states its been spreading. Pt denies fever, trauma or other injuries. Medications given as ordered. NAD noted.

## 2021-01-14 NOTE — ED PROVIDER NOTE - NSFOLLOWUPINSTRUCTIONS_ED_ALL_ED_FT
- Continue all regular medications  - For pain, take tylenol or ibuprofen as directed on the packaging  - Take cephalexin as prescribed for the entire course even if your symptoms have resolved  - Follow up with your primary doctor next week  - Return to the ER for any worsening symptoms or concerns

## 2021-01-14 NOTE — ED PROVIDER NOTE - PHYSICAL EXAMINATION
Attending/Hung: Well-appearing, PERR/EOMI, supple, RRR, CTAB, Abd-soft, NT/ND; RUE: 2nd finger-area of paronychia-min swelling, no drainage, min erythema; UE areas of light erythema somewhat blanching (within drawn lines from two days ago); no axillary LV, no PT, no swelling; A&Ox3, nonfocal

## 2021-03-30 ENCOUNTER — APPOINTMENT (OUTPATIENT)
Dept: CARDIOLOGY | Facility: CLINIC | Age: 46
End: 2021-03-30
Payer: MEDICARE

## 2021-03-30 PROCEDURE — 99214 OFFICE O/P EST MOD 30 MIN: CPT | Mod: 95

## 2021-04-02 NOTE — REASON FOR VISIT
[FreeTextEntry1] : RAFAEL ALVAREZ presents for cardiovascular/vascular medicine evaluation.  She was last seen by me in June 2013.  \par \par She is a 46 yo woman with morbid obesity (BMI 58-59), hyperlipidemia, hypertension, and dizziness/gait instability.  She has recently noted progressive symptoms of exertional dyspnea and was advised by her PMD to see a cardiologist.  No other active cardiac complaints.\par \par 12-lead ECG notes NSR 80 bpm, LAD, normal intervals.  Physical examination notable for BMI 58-59, mild ankle edema.  \par \par Eocardiogram to re-evaluate cardiac structure/function.

## 2021-04-05 ENCOUNTER — APPOINTMENT (OUTPATIENT)
Dept: SURGERY | Facility: CLINIC | Age: 46
End: 2021-04-05
Payer: MEDICARE

## 2021-04-05 VITALS
HEART RATE: 90 BPM | HEIGHT: 65 IN | TEMPERATURE: 97.6 F | BODY MASS INDEX: 48.82 KG/M2 | WEIGHT: 293 LBS | RESPIRATION RATE: 17 BRPM | OXYGEN SATURATION: 98 % | DIASTOLIC BLOOD PRESSURE: 81 MMHG | SYSTOLIC BLOOD PRESSURE: 136 MMHG

## 2021-04-05 DIAGNOSIS — O09.40 SUPERVISION OF PREGNANCY WITH GRAND MULTIPARITY, UNSPECIFIED TRIMESTER: ICD-10-CM

## 2021-04-05 DIAGNOSIS — O20.0 THREATENED ABORTION: ICD-10-CM

## 2021-04-05 DIAGNOSIS — O03.9 COMPLETE OR UNSPECIFIED SPONTANEOUS ABORTION W/OUT COMPLICATION: ICD-10-CM

## 2021-04-05 DIAGNOSIS — O09.299 SUPERVISION OF PREGNANCY WITH OTHER POOR REPRODUCTIVE OR OBSTETRIC HISTORY, UNSPECIFIED TRIMESTER: ICD-10-CM

## 2021-04-05 DIAGNOSIS — O09.529 SUPERVISION OF ELDERLY MULTIGRAVIDA, UNSPECIFIED TRIMESTER: ICD-10-CM

## 2021-04-05 DIAGNOSIS — Z12.4 ENCOUNTER FOR SCREENING FOR MALIGNANT NEOPLASM OF CERVIX: ICD-10-CM

## 2021-04-05 PROCEDURE — 99205 OFFICE O/P NEW HI 60 MIN: CPT

## 2021-04-05 PROCEDURE — 99072 ADDL SUPL MATRL&STAF TM PHE: CPT

## 2021-04-05 NOTE — PHYSICAL EXAM
[Obese, well nourished, in no acute distress] : obese, well nourished, in no acute distress [Normal] : affect appropriate [de-identified] : Nl respirations [de-identified] : soft, NT, ND

## 2021-04-05 NOTE — HISTORY OF PRESENT ILLNESS
[de-identified] : The patient is a 45 year old female here for a weight loss consultation visit. \par \par The patient has been struggling with severe obesity for many years.  In particular, she has a sweet tooth and feels she is addicted to sugars.  She was actually scheduled to undergo gastric bypass surgery at [facility], however was very anxious about it and actually canceled the procedure on the table.  She has since tried to lose weight on her own, and has attempted several diet and exercise programs without success.  The excess weight is starting to significantly affect her health and lifestyle.  She has hypertension and prediabetes and is worried about having to take medications for the rest of her life.  She is ready for surgery.\par \par \par Medical history is significant for hypertension, hyperlipidemia, prediabetes.\par Surgical history is significant for D&C.\par The patient denies prior history of blood clot or abnormal bleeding.\par The patient denies prior history of dysphagia or reflux.\par \par Referred by [default value].
Patient's belongings returned

## 2021-04-05 NOTE — ASSESSMENT
[FreeTextEntry1] : 45-year-old female with longstanding history of morbid obesity (height 5 feet 5 inches, weight 338 pounds, BMI 56) with comorbidities of hypertension, hyperlipidemia, and prediabetes.\par \par The patient has failed multiple prior attempts at weight loss and is now dealing with the side effects, risk factors, and poor quality of life associated with morbid obesity.  They would benefit from surgical weight loss as outlined in the NIH and  ASMBS consensus statements on bariatric surgery.  Surgical intervention is medically indicated.\par \par My impression is that the patient is a reasonable candidate for laparoscopic Fiona-en-Y gastric bypass, particularly given her predilection for sweets.\par

## 2021-04-06 ENCOUNTER — OUTPATIENT (OUTPATIENT)
Dept: OUTPATIENT SERVICES | Facility: HOSPITAL | Age: 46
LOS: 1 days | End: 2021-04-06

## 2021-04-06 ENCOUNTER — APPOINTMENT (OUTPATIENT)
Dept: CV DIAGNOSITCS | Facility: HOSPITAL | Age: 46
End: 2021-04-06
Payer: MEDICARE

## 2021-04-06 DIAGNOSIS — R06.00 DYSPNEA, UNSPECIFIED: ICD-10-CM

## 2021-04-06 PROCEDURE — 93306 TTE W/DOPPLER COMPLETE: CPT | Mod: 26

## 2021-04-14 ENCOUNTER — APPOINTMENT (OUTPATIENT)
Dept: CARDIOLOGY | Facility: CLINIC | Age: 46
End: 2021-04-14
Payer: MEDICARE

## 2021-04-14 ENCOUNTER — NON-APPOINTMENT (OUTPATIENT)
Age: 46
End: 2021-04-14

## 2021-04-14 VITALS
OXYGEN SATURATION: 98 % | BODY MASS INDEX: 50.02 KG/M2 | HEART RATE: 80 BPM | WEIGHT: 293 LBS | SYSTOLIC BLOOD PRESSURE: 142 MMHG | RESPIRATION RATE: 16 BRPM | TEMPERATURE: 98.3 F | HEIGHT: 64 IN | DIASTOLIC BLOOD PRESSURE: 85 MMHG

## 2021-04-14 DIAGNOSIS — R06.02 SHORTNESS OF BREATH: ICD-10-CM

## 2021-04-14 DIAGNOSIS — Z13.6 ENCOUNTER FOR SCREENING FOR CARDIOVASCULAR DISORDERS: ICD-10-CM

## 2021-04-14 LAB
BASOPHILS # BLD AUTO: 0.08 K/UL
BASOPHILS NFR BLD AUTO: 1 %
EOSINOPHIL # BLD AUTO: 0.14 K/UL
EOSINOPHIL NFR BLD AUTO: 1.8 %
HCT VFR BLD CALC: 42.7 %
HGB BLD-MCNC: 13.2 G/DL
IMM GRANULOCYTES NFR BLD AUTO: 0.4 %
LYMPHOCYTES # BLD AUTO: 2.2 K/UL
LYMPHOCYTES NFR BLD AUTO: 28.3 %
MAN DIFF?: NORMAL
MCHC RBC-ENTMCNC: 26.9 PG
MCHC RBC-ENTMCNC: 30.9 GM/DL
MCV RBC AUTO: 87.1 FL
MONOCYTES # BLD AUTO: 0.77 K/UL
MONOCYTES NFR BLD AUTO: 9.9 %
NEUTROPHILS # BLD AUTO: 4.56 K/UL
NEUTROPHILS NFR BLD AUTO: 58.6 %
PLATELET # BLD AUTO: 336 K/UL
RBC # BLD: 4.9 M/UL
RBC # FLD: 13.4 %
WBC # FLD AUTO: 7.78 K/UL

## 2021-04-14 PROCEDURE — 93000 ELECTROCARDIOGRAM COMPLETE: CPT

## 2021-04-14 PROCEDURE — 99214 OFFICE O/P EST MOD 30 MIN: CPT

## 2021-04-14 PROCEDURE — 99072 ADDL SUPL MATRL&STAF TM PHE: CPT

## 2021-04-16 LAB
25(OH)D3 SERPL-MCNC: 12.9 NG/ML
ALBUMIN SERPL ELPH-MCNC: 4.5 G/DL
ALP BLD-CCNC: 82 U/L
ALT SERPL-CCNC: 59 U/L
ANION GAP SERPL CALC-SCNC: 13 MMOL/L
APTT BLD: 32.8 SEC
AST SERPL-CCNC: 64 U/L
BILIRUB SERPL-MCNC: 0.2 MG/DL
BUN SERPL-MCNC: 11 MG/DL
CALCIUM SERPL-MCNC: 10 MG/DL
CHLORIDE SERPL-SCNC: 105 MMOL/L
CO2 SERPL-SCNC: 24 MMOL/L
CREAT SERPL-MCNC: 0.68 MG/DL
ESTIMATED AVERAGE GLUCOSE: 126 MG/DL
FOLATE SERPL-MCNC: 9.1 NG/ML
GLUCOSE SERPL-MCNC: 92 MG/DL
HBA1C MFR BLD HPLC: 6 %
HCG SERPL QL: NEGATIVE
INR PPP: 1.04 RATIO
IRON SERPL-MCNC: 45 UG/DL
PAPP-A SERPL-ACNC: <1 MIU/ML
POTASSIUM SERPL-SCNC: 4.9 MMOL/L
PROT SERPL-MCNC: 7.9 G/DL
PT BLD: 12.3 SEC
SODIUM SERPL-SCNC: 142 MMOL/L
TSH SERPL-ACNC: 2.75 UIU/ML
VIT B12 SERPL-MCNC: 478 PG/ML

## 2021-04-23 LAB — VIT B1 SERPL-MCNC: 120.3 NMOL/L

## 2021-04-24 PROBLEM — R06.02 SOB (SHORTNESS OF BREATH) ON EXERTION: Status: ACTIVE | Noted: 2020-12-03

## 2021-04-24 PROBLEM — Z13.6 SCREENING FOR CARDIOVASCULAR CONDITION: Status: ACTIVE | Noted: 2021-04-24

## 2021-04-28 NOTE — ED PROVIDER NOTE - CROS ED ROS STATEMENT
Your current Orthopaedic problem we are working together to treat is: right hip pain.    PHYSICAL THERAPY/OCCUPATIONAL THERAPY  Physical Therapy  will help your recovery. Please call to schedule your therapy appointments at [facility], [phone]. It would be advisable to follow up with me upon completion of your therapy.     IMAGING:  You have been recommended to undergo an MRI of your hip to help us better understand and treat your symptoms. Please stop at reception to schedule the imaging appointment or call [facility] [place] for Imaging - [phone] ([facility] sites & [facility] sites). Please call our office at least  2 day after your testing is completed to discuss results and follow up appointment.    It is recommended you schedule a follow-up appointment with Ben Donovan MD after your MRI.    Office hours are 8:00 am to 5:00 pm Monday through Friday. If it is urgent that you speak with someone outside of these hours, our [facility] [place][note 2] Call Center will be able to assist you. You can reach the office by calling the:    [address] [place], [place] [address] [phone]    We do highly recommend [employer] [facility], if you do not already have this. You can request access via the internet or by simply talking with a  at any of the clinics.   [email or web address].    You may receive a survey in the mail from [facility][note 2]. They mail and process patient satisfaction surveys for our clinic. Should you receive a survey, please take a few minutes to rate your experience with your visit.  We value your opinions and insights. Thank you in advance for your time and interest in responding.    Thank you for choosing [facility] [place]note 2] as your Orthopaedic provider!   all other ROS negative except as per HPI

## 2021-05-03 ENCOUNTER — APPOINTMENT (OUTPATIENT)
Dept: SURGERY | Facility: CLINIC | Age: 46
End: 2021-05-03

## 2021-07-27 NOTE — ED ADULT NURSE NOTE - FINAL NURSING ELECTRONIC SIGNATURE
Pt will need a refill on clopidogrel pt says she thought she had some at her pharmacy so she called them to see if she did and they told her there were no fills pt has appt 8-10 09-Apr-2019 02:36

## 2022-02-01 NOTE — PLAN
Medicare Wellness Visit, Male    The best way to live healthy is to have a lifestyle where you eat a well-balanced diet, exercise regularly, limit alcohol use, and quit all forms of tobacco/nicotine, if applicable. Regular preventive services are another way to keep healthy. Preventive services (vaccines, screening tests, monitoring & exams) can help personalize your care plan, which helps you manage your own care. Screening tests can find health problems at the earliest stages, when they are easiest to treat. [facility] follows the current, evidence-based guidelines published by the [facility] ([facility]) when recommending preventive services for our patients. Because we follow these guidelines, sometimes recommendations change over time as research supports it. (For example, a prostate screening blood test is no longer routinely recommended for men with no symptoms). Of course, you and your doctor may decide to screen more often for some diseases, based on your risk and co-morbidities (chronic disease you are already diagnosed with). Preventive services for you include:  - Medicare offers their members a free annual wellness visit, which is time for you and your primary care provider to discuss and plan for your preventive service needs. Take advantage of this benefit every year!  -All adults over age 72 should receive the recommended pneumonia vaccines. Current USPSTF guidelines recommend a series of two vaccines for the best pneumonia protection.   -All adults should have a flu vaccine yearly and tetanus vaccine every 10 years.  -All adults age 48 and older should receive the shingles vaccines (series of two vaccines).        -All adults age 38-68 who are overweight should have a diabetes screening test once every three years.   -Other screening tests & preventive services for persons with diabetes include: an eye exam to screen for diabetic retinopathy, a kidney function test, a foot exam, and stricter control over your cholesterol.   -Cardiovascular screening for adults with routine risk involves an electrocardiogram (ECG) at intervals determined by the provider.   -Colorectal cancer screening should be done for adults age 54-65 with no increased risk factors for colorectal cancer. There are a number of acceptable methods of screening for this type of cancer. Each test has its own benefits and drawbacks. Discuss with your provider what is most appropriate for you during your annual wellness visit. The different tests include: colonoscopy (considered the best screening method), a fecal occult blood test, a fecal DNA test, and sigmoidoscopy.  -All adults born between [facility] should be screened once for Hepatitis C.  -An Abdominal Aortic Aneurysm (AAA) Screening is recommended for men age 73-68 who has ever smoked in their lifetime. Here is a list of your current Health Maintenance items (your personalized list of preventive services) with a due date:  Health Maintenance Due   Topic Date Due    DTaP/Tdap/Td  (1 - Tdap) Never done    Shingles Vaccine (1 of 2) Never done    Eye Exam  10/11/2019    Albumin Urine Test  01/27/2022          Learning About Marvin June  What is a living will? A living will is a legal form you use to write down the kind of care you want at the end of your life. It is used by the health professionals who will treat you if you aren't able to decide for yourself. If you put your wishes in writing, your loved ones and others will know what kind of care you want. They won't need to guess. This can ease your mind and be helpful to others. A living will is not the same as an estate or property will. An estate will explains what you want to happen with your money and property after you die. Is a living will a legal document? A living will is a legal document. Each state has its own laws about living pedroza.  If you move to another state, make sure that your living will is legal in the state where you now live. Or you might use a universal form that has been approved by many states. This kind of form can sometimes be completed and stored online. Your electronic copy will then be available wherever you have a connection to the Internet. In most cases, doctors will respect your wishes even if you have a form from a different state. · You don't need an  to complete a living will. But legal advice can be helpful if your state's laws are unclear, your health history is complicated, or your family can't agree on what should be in your living will. · You can change your living will at any time. Some people find that their wishes about end-of-life care change as their health changes. · In addition to making a living will, think about completing a medical power of  form. This form lets you name the person you want to make end-of-life treatment decisions for you (your \"health care agent\") if you're not able to. Many hospitals and nursing homes will give you the forms you need to complete a living will and a medical power of . · Your living will is used only if you can't make or communicate decisions for yourself anymore. If you become able to make decisions again, you can accept or refuse any treatment, no matter what you wrote in your living will. · Your state may offer an online registry. This is a place where you can store your living will online so the doctors and nurses who need to treat you can find it right away. What should you think about when creating a living will? Talk about your end-of-life wishes with your family members and your doctor. Let them know what you want. That way the people making decisions for you won't be surprised by your choices. Think about these questions as you make your living will:  · Do you know enough about life support methods that might be used?  If not, talk to your doctor so you know what might be done if you can't breathe on your own, your heart stops, or you're unable to swallow. · What things would you still want to be able to do after you receive life-support methods? Would you want to be able to walk? To speak? To eat on your own? To live without the help of machines? · If you have a choice, where do you want to be cared for? In your home? At a hospital or nursing home? · Do you want certain Taoist practices performed if you become very ill? · If you have a choice at the end of your life, where would you prefer to die? At home? In a hospital or nursing home? Somewhere else? · Would you prefer to be buried or cremated? · Do you want your organs to be donated after you die? What should you do with your living will? · Make sure that your family members and your health care agent have copies of your living will. · Give your doctor a copy of your living will to keep in your medical record. If you have more than one doctor, make sure that each one has a copy. · You may want to put a copy of your living will where it can be easily found. Where can you learn more? Go to http://www.gray.com/. Enter V138 in the search box to learn more about \"Learning About Living Perrolilly. \"  Current as of: August 8, 2016  Content Version: 11.3  © 2321-2987 [employer]. Care instructions adapted under license by [employer] (which disclaims liability or warranty for this information). If you have questions about a medical condition or this instruction, always ask your healthcare professional. [address] any warranty or liability for your use of this information. Preventing Falls: Care Instructions  Your Care Instructions    Getting around your home safely can be a challenge if you have injuries or health problems that make it easy for you to fall.  Loose rugs and furniture in walkways are among the dangers for many older people who have problems walking or who have poor eyesight. People who have conditions such as arthritis, osteoporosis, or dementia also have to be careful not to fall. You can make your home safer with a few simple measures. Follow-up care is a key part of your treatment and safety. Be sure to make and go to all appointments, and call your doctor if you are having problems. It's also a good idea to know your test results and keep a list of the medicines you take. How can you care for yourself at home? Taking care of yourself  · You may get dizzy if you do not drink enough water. To prevent dehydration, drink plenty of fluids, enough so that your urine is light yellow or clear like water. Choose water and other caffeine-free clear liquids. If you have kidney, heart, or liver disease and have to limit fluids, talk with your doctor before you increase the amount of fluids you drink. · Exercise regularly to improve your strength, muscle tone, and balance. Walk if you can. Swimming may be a good choice if you cannot walk easily. · Have your vision and hearing checked each year or any time you notice a change. If you have trouble seeing and hearing, you might not be able to avoid objects and could lose your balance. · Know the side effects of the medicines you take. Ask your doctor or pharmacist whether the medicines you take can affect your balance. Sleeping pills or sedatives can affect your balance. · Limit the amount of alcohol you drink. Alcohol can impair your balance and other senses. · Ask your doctor whether calluses or corns on your feet need to be removed. If you wear loose-fitting shoes because of calluses or corns, you can lose your balance and fall. · Talk to your doctor if you have numbness in your feet. Preventing falls at home  · Remove raised doorway thresholds, throw rugs, and clutter. Repair loose carpet or raised areas in the floor.   · Move furniture and electrical cords to keep them out of walking paths. · Use nonskid floor wax, and wipe up spills right away, especially on ceramic tile floors. · If you use a walker or cane, put rubber tips on it. If you use crutches, clean the bottoms of them regularly with an abrasive pad, such as steel wool. · Keep your house well lit, especially [place], and outside walkways. Use night-lights in areas such as hallways and bathrooms. Add extra light switches or use remote switches (such as switches that go on or off when you clap your hands) to make it easier to turn lights on if you have to get up during the night. · Install sturdy handrails on stairways. · Move items in your cabinets so that the things you use a lot are on the lower shelves (about waist level). · Keep a cordless phone and a flashlight with new batteries by your bed. If possible, put a phone in each of the main rooms of your house, or carry a cell phone in case you fall and cannot reach a phone. Or, you can wear a device around your neck or wrist. You push a button that sends a signal for help. · Wear low-heeled shoes that fit well and give your feet good support. Use footwear with nonskid soles. Check the heels and soles of your shoes for wear. Repair or replace worn heels or soles. · Do not wear socks without shoes on wood floors. · Walk on the grass when the sidewalks are slippery. If you live in an area that gets snow and ice in the winter, sprinkle salt on slippery steps and sidewalks. Preventing falls in the bath  · Install grab bars and nonskid mats inside and outside your shower or tub and near the toilet and sinks. · Use shower chairs and bath benches. · Use a hand-held shower head that will allow you to sit while showering.   · Get into a tub or shower by putting the weaker leg in first. Get out of a tub or shower with your strong side first.  · Repair loose toilet seats and consider installing a raised toilet seat to make getting on and off the [FreeTextEntry1] : I have discussed my impression and treatment plan with the patient.  All surgical options were discussed including non-surgical treatments.  The patient would like to proceed with laparoscopic Fiona-en-Y gastric bypass.  \par \par Benefits and risks of the planned surgery were discussed in depth, particularly leak, bleeding, sepsis, blood clots, ulcer, malnutrition, internal hernia and complications related to adhesions, weight regain, prolonged hospital stay and the remote possibility of death.   Also discussed was the importance of adhering to the recommended nutritional guidelines and supplements and attending regular follow-up.  I reviewed the critical importance of behavioral modification and follow-up in order to optimize outcomes and avoid complications. The patient was given the opportunity to ask pertinent questions and expressed good understanding of the aforementioned issues.\par \par Plan will be for laparoscopic  Fiona-en-Y gastric bypass after completion of:\par - medical weight management program\par - upper endoscopy\par - nutritional evaluation\par - medical, pulmonary and cardiac clearance\par - psychological evaluation toilet easier. · Keep your bathroom door unlocked while you are in the shower. Where can you learn more? Go to http://www.grove.com/. Enter 0476 [id number] in the search box to learn more about \"Preventing Falls: Care Instructions. \"  Current as of: March 16, 2018  Content Version: 11.8  © 3069-5523 [employer]. Care instructions adapted under license by [employer] (which disclaims liability or warranty for this information). If you have questions about a medical condition or this instruction, always ask your healthcare professional. [place] [address] any warranty or liability for your use of this information. Hernia: Care Instructions  Your Care Instructions     A hernia develops when tissue bulges through a weak spot in the wall of your belly. The groin area and the navel are common areas for a hernia. A hernia can also develop near the area of a surgery you had before. Pressure from lifting, straining, or coughing can tear the weak area, causing the hernia to bulge and be painful. If you cannot push a hernia back into place, the tissue may become trapped outside the belly wall. If the hernia gets twisted and loses its blood supply, it will swell and die. This is called a strangulated hernia. It usually causes a lot of pain. It needs treatment right away. Some hernias need to be repaired to prevent a strangulated hernia. If your hernia causes symptoms or is large, you may need surgery. Follow-up care is a key part of your treatment and safety. Be sure to make and go to all appointments, and call your doctor if you are having problems. It's also a good idea to know your test results and keep a list of the medicines you take. How can you care for yourself at home? · Take care when lifting heavy objects. · Stay at a healthy weight. · Do not smoke. Smoking can cause coughing, which can cause your hernia to bulge.  If you need help quitting, talk to your doctor about stop-smoking programs and medicines. These can increase your chances of quitting for good. · Talk with your doctor before wearing a corset or truss for a hernia. These devices are not recommended for treating hernias and sometimes can do more harm than good. There may be certain situations when your doctor thinks a truss would work, but these are rare. When should you call for help? Call your doctor now or seek immediate medical care if:    · You have new or worse belly pain.     · You are vomiting.     · You cannot pass stools or gas.     · You cannot push the hernia back into place with gentle pressure when you are lying down.     · The area over the hernia turns red or becomes tender. Watch closely for changes in your health, and be sure to contact your doctor if you have any problems. Where can you learn more? Go to http://www.gray.com/  Enter C129 in the search box to learn more about \"Hernia: Care Instructions. \"  Current as of: February 10, 2021               Content Version: 13.0  © 2006-2021 Healthwise[employer]. Care instructions adapted under license by [employer] (which disclaims liability or warranty for this information). If you have questions about a medical condition or this instruction, always ask your healthcare professional. [address] any warranty or liability for your use of this information.

## 2022-02-02 ENCOUNTER — APPOINTMENT (OUTPATIENT)
Dept: CARDIOLOGY | Facility: CLINIC | Age: 47
End: 2022-02-02
Payer: MEDICARE

## 2022-02-02 VITALS — WEIGHT: 293 LBS | BODY MASS INDEX: 60.59 KG/M2

## 2022-02-02 VITALS
OXYGEN SATURATION: 98 % | SYSTOLIC BLOOD PRESSURE: 134 MMHG | HEART RATE: 79 BPM | DIASTOLIC BLOOD PRESSURE: 85 MMHG | HEIGHT: 64 IN

## 2022-02-02 DIAGNOSIS — R06.00 DYSPNEA, UNSPECIFIED: ICD-10-CM

## 2022-02-02 DIAGNOSIS — Z01.810 ENCOUNTER FOR PREPROCEDURAL CARDIOVASCULAR EXAMINATION: ICD-10-CM

## 2022-02-02 DIAGNOSIS — R12 HEARTBURN: ICD-10-CM

## 2022-02-02 DIAGNOSIS — E78.5 HYPERLIPIDEMIA, UNSPECIFIED: ICD-10-CM

## 2022-02-02 DIAGNOSIS — R00.2 PALPITATIONS: ICD-10-CM

## 2022-02-02 PROCEDURE — 93000 ELECTROCARDIOGRAM COMPLETE: CPT

## 2022-02-02 PROCEDURE — 99214 OFFICE O/P EST MOD 30 MIN: CPT

## 2022-02-08 ENCOUNTER — APPOINTMENT (OUTPATIENT)
Dept: CV DIAGNOSITCS | Facility: HOSPITAL | Age: 47
End: 2022-02-08
Payer: MEDICARE

## 2022-02-08 ENCOUNTER — OUTPATIENT (OUTPATIENT)
Dept: OUTPATIENT SERVICES | Facility: HOSPITAL | Age: 47
LOS: 1 days | End: 2022-02-08

## 2022-02-08 DIAGNOSIS — R07.9 CHEST PAIN, UNSPECIFIED: ICD-10-CM

## 2022-02-08 PROCEDURE — 93306 TTE W/DOPPLER COMPLETE: CPT | Mod: 26

## 2022-05-25 ENCOUNTER — EMERGENCY (EMERGENCY)
Facility: HOSPITAL | Age: 47
LOS: 1 days | Discharge: HOME | End: 2022-05-25
Attending: EMERGENCY MEDICINE | Admitting: EMERGENCY MEDICINE
Payer: MEDICARE

## 2022-05-25 VITALS
HEART RATE: 86 BPM | SYSTOLIC BLOOD PRESSURE: 153 MMHG | DIASTOLIC BLOOD PRESSURE: 91 MMHG | HEIGHT: 65 IN | TEMPERATURE: 98 F | OXYGEN SATURATION: 100 % | RESPIRATION RATE: 18 BRPM

## 2022-05-25 PROCEDURE — 93971 EXTREMITY STUDY: CPT | Mod: 26,LT

## 2022-05-25 PROCEDURE — 99284 EMERGENCY DEPT VISIT MOD MDM: CPT

## 2022-05-25 NOTE — ED ADULT TRIAGE NOTE - CHIEF COMPLAINT QUOTE
c/o right calf pain since yesterday, pt stating heard a pop and feels a ripping sensation when walking, cannot bear weight, denies PMH

## 2022-05-25 NOTE — ED PROVIDER NOTE - OBJECTIVE STATEMENT
45 y/o F presents to the ED c/o right calf pain x3 days PTA. Pt states they first noticed pain when walking down a flight of stairs. Describes pain as a tearing sensation in mid calf, following an initial pop. She is concerned today due to increased tearing sensation. Denies swelling of legs history of blood clots and similar symptoms in the past. Pt is currently not on any medication. She reports no numbness and weakness in the right foot. 45 y/o F presents to the ED c/o right calf pain x3 days PTA. Pt states they first noticed pain when walking down a flight of stairs. Describes pain as a tearing sensation in mid calf, following an initial pop. She is concerned today due to increased tearing sensation. Denies swelling of legs history of blood clots and similar symptoms in the past. Pt is currently not on any medication. She reports no numbness and weakness in the right foot.    Attendinyo female presents with right calf pain for 3 days.  states she was walking a few days ago and felt a pop in the right calf.  saw doctor and is scheduled for duplex ultrasound tomorrow.

## 2022-05-25 NOTE — ED PROVIDER NOTE - NSFOLLOWUPCLINICS_GEN_ALL_ED_FT
[facility]  Sports Medicine  [address] [place] [address]  Phone: [phone]  Fax:     [facility]  Orthopedic Surgery  [address], 3rd & 4th [place] [address]  Phone: [phone]  Fax:   Follow Up Time: 4-6 Days    [facility] [place]  Orthopedics  .  [place]   Phone: [phone]  Fax:   Follow Up Time: 4-6 Days

## 2022-05-25 NOTE — ED PROVIDER NOTE - NSICDXPASTMEDICALHX_GEN_ALL_CORE_FT
PAST MEDICAL HISTORY:  Hypertension--diet and weight controlled lost 15 pounds over 1.5 months    metorrhagia/Bleeding     Morbid Obesity     Murmur     personal h/o Fatty Liver documented on testing     personal h/o Iron Deficiency Anemia     Vertigo

## 2022-05-25 NOTE — ED PROVIDER NOTE - PATIENT PORTAL LINK FT
You can access the FollowMyHealth Patient Portal offered by [facility] by registering at the following website: [email or web address]. By joining [employer]’s FollowMyHealth portal, you will also be able to view your health information using other applications (apps) compatible with our system.

## 2022-05-25 NOTE — ED PROVIDER NOTE - NSFOLLOWUPINSTRUCTIONS_ED_ALL_ED_FT
1. You presented to the emergency department for:  calf pain    2. Your evaluation in the emergency department included a physician evaluation and testing consisting of: ultrasound. Your work-up did not reveal any findings indicating the need for admission to the hospital or any emergent interventions at this time. You will need to follow up regarding the results of your: *****    3. It is recommended that you follow-up with your primary doctor or an orthopedic doctor within 5-7 days as discussed for a repeat evaluation, and potentially further testing and treatment.     If needed, to arrange an appointment with a primary care provider please call: [phone]    4. Please continue taking your regular medications as prescribed.     For pain you may take 500-1000mg Tylenol every 8 hours - as needed.  This is an over-the-counter medication - please read the instructions for use and warnings on the label. If you have any questions regarding its use, you may refer them to your local pharmacist.    You can use 400mg Ibuprofen (such as motrin or advil) every 8 hours as needed for pain control.  Take ibuprofen with food or milk to lessen stomach upset.  This is an over-the-counter medication please respect the warnings on the label. All medications come with certain risks and side effects that you need to discuss with your doctor, especially if you are taking them for a prolonged period (beyond 3-4 days).    5. PLEASE RETURN TO THE EMERGENCY DEPARTMENT IMMEDIATELY IF you develop any fevers not responding to over the counter medications, uncontrollable nausea and vomiting, an inability to tolerate eating and drinking, difficulty breathing, chest pain, a severe increase in your symptoms or pain, or any other new symptoms that concern you.

## 2022-06-28 ENCOUNTER — APPOINTMENT (OUTPATIENT)
Dept: SURGERY | Facility: CLINIC | Age: 47
End: 2022-06-28
Payer: MEDICARE

## 2022-06-28 VITALS
RESPIRATION RATE: 17 BRPM | OXYGEN SATURATION: 96 % | HEIGHT: 65 IN | BODY MASS INDEX: 48.82 KG/M2 | HEART RATE: 84 BPM | SYSTOLIC BLOOD PRESSURE: 153 MMHG | DIASTOLIC BLOOD PRESSURE: 87 MMHG | WEIGHT: 293 LBS | TEMPERATURE: 97.1 F

## 2022-06-28 PROCEDURE — 99213 OFFICE O/P EST LOW 20 MIN: CPT

## 2022-06-28 NOTE — ASSESSMENT
[FreeTextEntry1] : 46-year-old female with longstanding history of morbid obesity (BMI 60) with comorbidities of hypertension, hyperlipidemia, and prediabetes.  She presents for continuing medical weight management in preparation for planned bariatric surgery.  She is deciding between sleeve gastrectomy and gastric bypass.\par \par

## 2022-06-28 NOTE — HISTORY OF PRESENT ILLNESS
[de-identified] : RAFAEL  is a 46 year  female  here for a reconsultation for weight loss surgery.\par She is trying to lose weight on her own, however has only seen her weight go up the last couple of months.  She struggling with portion control and food choices.\par She reports no changes in her medical history.

## 2022-06-28 NOTE — PHYSICAL EXAM
[Obese, well nourished, in no acute distress] : obese, well nourished, in no acute distress [Normal] : affect appropriate [de-identified] : Nl respirations [de-identified] : soft, NT, ND

## 2022-07-01 ENCOUNTER — APPOINTMENT (OUTPATIENT)
Dept: CARDIOLOGY | Facility: CLINIC | Age: 47
End: 2022-07-01

## 2022-07-08 LAB
25(OH)D3 SERPL-MCNC: 16.3 NG/ML
ALBUMIN SERPL ELPH-MCNC: 4.2 G/DL
ALP BLD-CCNC: 78 U/L
ALT SERPL-CCNC: 24 U/L
ANION GAP SERPL CALC-SCNC: 12 MMOL/L
APTT BLD: 29.4 SEC
AST SERPL-CCNC: 23 U/L
BASOPHILS # BLD AUTO: 0.12 K/UL
BASOPHILS NFR BLD AUTO: 0.9 %
BILIRUB SERPL-MCNC: 0.2 MG/DL
BUN SERPL-MCNC: 11 MG/DL
CALCIUM SERPL-MCNC: 9.4 MG/DL
CHLORIDE SERPL-SCNC: 103 MMOL/L
CO2 SERPL-SCNC: 25 MMOL/L
CREAT SERPL-MCNC: 0.6 MG/DL
EGFR: 113 ML/MIN/1.73M2
EOSINOPHIL # BLD AUTO: 0.22 K/UL
EOSINOPHIL NFR BLD AUTO: 1.6 %
ESTIMATED AVERAGE GLUCOSE: 128 MG/DL
FERRITIN SERPL-MCNC: 40 NG/ML
FOLATE SERPL-MCNC: 7.4 NG/ML
GLUCOSE SERPL-MCNC: 107 MG/DL
HBA1C MFR BLD HPLC: 6.1 %
HCG SERPL QL: NEGATIVE
HCT VFR BLD CALC: 40.3 %
HGB BLD-MCNC: 12.4 G/DL
IMM GRANULOCYTES NFR BLD AUTO: 0.7 %
INR PPP: 1.03 RATIO
IRON SERPL-MCNC: 88 UG/DL
LYMPHOCYTES # BLD AUTO: 2.77 K/UL
LYMPHOCYTES NFR BLD AUTO: 20.8 %
MAN DIFF?: NORMAL
MCHC RBC-ENTMCNC: 27.3 PG
MCHC RBC-ENTMCNC: 30.8 GM/DL
MCV RBC AUTO: 88.8 FL
MONOCYTES # BLD AUTO: 1.04 K/UL
MONOCYTES NFR BLD AUTO: 7.8 %
NEUTROPHILS # BLD AUTO: 9.09 K/UL
NEUTROPHILS NFR BLD AUTO: 68.2 %
PAPP-A SERPL-ACNC: <1 MIU/ML
PLATELET # BLD AUTO: 293 K/UL
POTASSIUM SERPL-SCNC: 4.9 MMOL/L
PROT SERPL-MCNC: 7.1 G/DL
PT BLD: 12.1 SEC
RBC # BLD: 4.54 M/UL
RBC # FLD: 13.7 %
SODIUM SERPL-SCNC: 140 MMOL/L
TSH SERPL-ACNC: 3.57 UIU/ML
VIT B12 SERPL-MCNC: 293 PG/ML
WBC # FLD AUTO: 13.34 K/UL

## 2022-07-12 ENCOUNTER — APPOINTMENT (OUTPATIENT)
Dept: RADIOLOGY | Facility: HOSPITAL | Age: 47
End: 2022-07-12

## 2022-07-12 ENCOUNTER — APPOINTMENT (OUTPATIENT)
Dept: ULTRASOUND IMAGING | Facility: HOSPITAL | Age: 47
End: 2022-07-12

## 2022-07-12 ENCOUNTER — OUTPATIENT (OUTPATIENT)
Dept: OUTPATIENT SERVICES | Facility: HOSPITAL | Age: 47
LOS: 1 days | End: 2022-07-12
Payer: MEDICARE

## 2022-07-12 DIAGNOSIS — E66.01 MORBID (SEVERE) OBESITY DUE TO EXCESS CALORIES: ICD-10-CM

## 2022-07-12 PROCEDURE — 74240 X-RAY XM UPR GI TRC 1CNTRST: CPT | Mod: 26

## 2022-07-12 PROCEDURE — 76700 US EXAM ABDOM COMPLETE: CPT

## 2022-07-12 PROCEDURE — 76700 US EXAM ABDOM COMPLETE: CPT | Mod: 26

## 2022-07-12 PROCEDURE — 74240 X-RAY XM UPR GI TRC 1CNTRST: CPT

## 2022-07-14 RX ORDER — AMOXICILLIN AND CLAVULANATE POTASSIUM 875; 125 MG/1; MG/1
875-125 TABLET, COATED ORAL
Qty: 14 | Refills: 0 | Status: COMPLETED | COMMUNITY
Start: 2022-06-08

## 2022-07-15 ENCOUNTER — APPOINTMENT (OUTPATIENT)
Dept: OBGYN | Facility: CLINIC | Age: 47
End: 2022-07-15

## 2022-07-15 VITALS
SYSTOLIC BLOOD PRESSURE: 141 MMHG | WEIGHT: 293 LBS | BODY MASS INDEX: 48.82 KG/M2 | DIASTOLIC BLOOD PRESSURE: 89 MMHG | HEIGHT: 65 IN

## 2022-07-15 DIAGNOSIS — Z12.39 ENCOUNTER FOR OTHER SCREENING FOR MALIGNANT NEOPLASM OF BREAST: ICD-10-CM

## 2022-07-15 DIAGNOSIS — Z11.3 ENCOUNTER FOR SCREENING FOR INFECTIONS WITH A PREDOMINANTLY SEXUAL MODE OF TRANSMISSION: ICD-10-CM

## 2022-07-15 PROCEDURE — 99396 PREV VISIT EST AGE 40-64: CPT

## 2022-07-15 PROCEDURE — 81003 URINALYSIS AUTO W/O SCOPE: CPT | Mod: QW

## 2022-07-15 NOTE — DISCUSSION/SUMMARY
[FreeTextEntry1] : Pap HPV\par Urine culture will review and treat accordingly\par GC Chlamydia\par mmg/US\par STD panel\par TVS (limited exam)\par Pt will forward hormonal panel for review from PCP\par F/U TEB result review

## 2022-07-15 NOTE — REVIEW OF SYSTEMS
[Patient Intake Form Reviewed] : Patient intake form was reviewed [Negative] : Heme/Lymph [FreeTextEntry8] : irregular cycles

## 2022-07-15 NOTE — HISTORY OF PRESENT ILLNESS
[N] : Patient is not sexually active [Y] : Positive pregnancy history [TextBox_4] : 45 y/o  here for annual. Was advised by PCP her blood count had elevated WBC and pt requesting urine culture. Denies urinary sx, pain or fever. POC testing today small leukocytes. Planned gastric sleeve. Doing clearance testing.\par - from  due to infidelity. Not sexually active X several years. Request STD screen.\par -LMP 20. No withdrawal bleed with Provera challenge . Previously referred to [facility] for W/U however did not f/u. Reports PCP (Dr. Dominique Wright) did hormone panel 3 wks ago. No results available at time of visit. Denies menopausal sx.\par \par -3/28/16 TVS 1.6 subserosal fibroid suggestion of adenomyosis, L 3.3cm functional cyst. [Mammogramdate] : 10/8/20 [TextBox_19] : NEG [PapSmeardate] : 10/8/20 [TextBox_31] : neg with trich [HPVDate] : 10/8/20 [TextBox_78] : neg [LMPDate] : 11/27/20 [PGHxTotal] : 5 [[facility]] : 5 [[facility] : 5 [PGHxABSpont] : 2 [FreeTextEntry1] : 11/27/20 [No] : Patient does not have concerns regarding sex [Previously active] : previously active [Men] : men [Vaginal] : vaginal [Patient would like to be screened for STIs] : Patient would like to be screened for STIs

## 2022-07-15 NOTE — PHYSICAL EXAM
[Chaperone Present] : A chaperone was present in the examining room during all aspects of the physical examination [Appropriately responsive] : appropriately responsive [Alert] : alert [No Acute Distress] : no acute distress [No Lymphadenopathy] : no lymphadenopathy [No Murmurs] : no murmurs [Soft] : soft [Non-tender] : non-tender [Non-distended] : non-distended [No HSM] : No HSM [No Lesions] : no lesions [No Mass] : no mass [Oriented x3] : oriented x3 [FreeTextEntry7] : morbid obesity [Examination Of The Breasts] : a normal appearance [No Masses] : no breast masses were palpable [Labia Majora] : normal [Labia Minora] : normal [Normal] : normal [Uterine Adnexae] : normal [FreeTextEntry6] : limited due to body habitus [FreeTextEntry8] : Unremarkable

## 2022-07-16 LAB
HBV SURFACE AG SER QL: NONREACTIVE
HCV AB SER QL: NONREACTIVE
HCV S/CO RATIO: 0.12 S/CO
HIV1+2 AB SPEC QL IA.RAPID: NONREACTIVE
T PALLIDUM AB SER QL IA: NEGATIVE

## 2022-07-17 ENCOUNTER — NON-APPOINTMENT (OUTPATIENT)
Age: 47
End: 2022-07-17

## 2022-07-18 ENCOUNTER — NON-APPOINTMENT (OUTPATIENT)
Age: 47
End: 2022-07-18

## 2022-07-18 ENCOUNTER — ASOB RESULT (OUTPATIENT)
Age: 47
End: 2022-07-18

## 2022-07-18 ENCOUNTER — APPOINTMENT (OUTPATIENT)
Dept: OBGYN | Facility: CLINIC | Age: 47
End: 2022-07-18

## 2022-07-18 LAB
BACTERIA UR CULT: NORMAL
C TRACH RRNA SPEC QL NAA+PROBE: NOT DETECTED
HPV HIGH+LOW RISK DNA PNL CVX: NOT DETECTED
N GONORRHOEA RRNA SPEC QL NAA+PROBE: NOT DETECTED
SOURCE AMPLIFICATION: NORMAL

## 2022-07-18 PROCEDURE — 76830 TRANSVAGINAL US NON-OB: CPT

## 2022-07-19 ENCOUNTER — APPOINTMENT (OUTPATIENT)
Dept: OBGYN | Facility: CLINIC | Age: 47
End: 2022-07-19

## 2022-07-19 ENCOUNTER — APPOINTMENT (OUTPATIENT)
Dept: MAMMOGRAPHY | Facility: IMAGING CENTER | Age: 47
End: 2022-07-19

## 2022-07-19 ENCOUNTER — APPOINTMENT (OUTPATIENT)
Dept: ULTRASOUND IMAGING | Facility: IMAGING CENTER | Age: 47
End: 2022-07-19

## 2022-07-19 DIAGNOSIS — R82.90 UNSPECIFIED ABNORMAL FINDINGS IN URINE: ICD-10-CM

## 2022-07-19 DIAGNOSIS — Z87.42 PERSONAL HISTORY OF OTHER DISEASES OF THE FEMALE GENITAL TRACT: ICD-10-CM

## 2022-07-19 PROCEDURE — 99212 OFFICE O/P EST SF 10 MIN: CPT | Mod: 95

## 2022-07-19 NOTE — DISCUSSION/SUMMARY
[FreeTextEntry1] : Reviewed in detail results of limited TVS, showing thickened lining. Discussed increased risk for endometrial cancer and/or DUB.\par -Labs subsequent to visit were obtained from PCP and will f/u with pt.\par -Pt will try to obtain genetic testing request and forward. \par -Will review management with MD and f/u with pt accordingly.\par -All questions answered. Pt voiced understanding.\par

## 2022-07-19 NOTE — HISTORY OF PRESENT ILLNESS
[Other Location: e.g. Home (Enter Location, City,State)___] : at [unfilled] [Verbal consent obtained from patient] : the patient, [unfilled] [N] : Patient is not sexually active [Y] : Positive pregnancy history [No] : Patient does not have concerns regarding sex [Previously active] : previously active [Men] : men [Vaginal] : vaginal [Patient would like to be screened for STIs] : Patient would like to be screened for STIs [Mammogramdate] : 10/8/20 [TextBox_19] : NEG [TextBox_31] : neg with trich [PapSmeardate] : 10/8/20 [HPVDate] : 10/8/20 [TextBox_78] : neg [LMPDate] : 11/27/20 [PGHxTotal] : 5 [[facility]] : 5 [[facility] : 5 [PGHxABSpont] : 2 [FreeTextEntry1] : 11/27/20

## 2022-07-20 LAB — VIT B1 SERPL-MCNC: 188.3 NMOL/L

## 2022-07-21 ENCOUNTER — NON-APPOINTMENT (OUTPATIENT)
Age: 47
End: 2022-07-21

## 2022-07-22 ENCOUNTER — NON-APPOINTMENT (OUTPATIENT)
Age: 47
End: 2022-07-22

## 2022-07-22 DIAGNOSIS — A59.9 TRICHOMONIASIS, UNSPECIFIED: ICD-10-CM

## 2022-07-22 DIAGNOSIS — Z13.71 ENCOUNTER FOR NONPROCREATIVE SCREENING FOR GENETIC DISEASE CARRIER STATUS: ICD-10-CM

## 2022-07-22 LAB — CYTOLOGY CVX/VAG DOC THIN PREP: ABNORMAL

## 2022-07-27 ENCOUNTER — NON-APPOINTMENT (OUTPATIENT)
Age: 47
End: 2022-07-27

## 2022-07-27 ENCOUNTER — APPOINTMENT (OUTPATIENT)
Dept: BARIATRICS | Facility: CLINIC | Age: 47
End: 2022-07-27

## 2022-07-27 VITALS
SYSTOLIC BLOOD PRESSURE: 126 MMHG | RESPIRATION RATE: 16 BRPM | BODY MASS INDEX: 48.82 KG/M2 | HEIGHT: 65 IN | DIASTOLIC BLOOD PRESSURE: 81 MMHG | HEART RATE: 102 BPM | WEIGHT: 293 LBS | TEMPERATURE: 97.1 F | OXYGEN SATURATION: 96 %

## 2022-07-27 DIAGNOSIS — Z00.00 ENCOUNTER FOR GENERAL ADULT MEDICAL EXAMINATION W/OUT ABNORMAL FINDINGS: ICD-10-CM

## 2022-07-27 PROCEDURE — 97802 MEDICAL NUTRITION INDIV IN: CPT

## 2022-07-29 ENCOUNTER — APPOINTMENT (OUTPATIENT)
Dept: SURGERY | Facility: CLINIC | Age: 47
End: 2022-07-29

## 2022-07-29 ENCOUNTER — OUTPATIENT (OUTPATIENT)
Dept: OUTPATIENT SERVICES | Facility: HOSPITAL | Age: 47
LOS: 1 days | End: 2022-07-29
Payer: MEDICARE

## 2022-07-29 ENCOUNTER — APPOINTMENT (OUTPATIENT)
Dept: CV DIAGNOSTICS | Facility: HOSPITAL | Age: 47
End: 2022-07-29

## 2022-07-29 VITALS
HEART RATE: 72 BPM | SYSTOLIC BLOOD PRESSURE: 142 MMHG | BODY MASS INDEX: 48.82 KG/M2 | DIASTOLIC BLOOD PRESSURE: 89 MMHG | HEIGHT: 65 IN | TEMPERATURE: 98 F | WEIGHT: 293 LBS | RESPIRATION RATE: 18 BRPM | OXYGEN SATURATION: 97 %

## 2022-07-29 DIAGNOSIS — Z01.810 ENCOUNTER FOR PREPROCEDURAL CARDIOVASCULAR EXAMINATION: ICD-10-CM

## 2022-07-29 PROCEDURE — 99213 OFFICE O/P EST LOW 20 MIN: CPT

## 2022-07-29 PROCEDURE — 93016 CV STRESS TEST SUPVJ ONLY: CPT

## 2022-07-29 PROCEDURE — 93018 CV STRESS TEST I&R ONLY: CPT

## 2022-07-29 PROCEDURE — 93017 CV STRESS TEST TRACING ONLY: CPT

## 2022-07-29 NOTE — PLAN
[FreeTextEntry1] : Encouraged to eat more whole foods, read food labels and reduce intake of processed foods.\par Advised to eat more slowly, chew more thoroughly, put fork down in between bites. \par Advised to start a walking regimen -- 10 minutes, twice per day, every day.\par Discussed importance of incorporating these healthy eating habits in order to optimize post-operative outcome.\par \par F/u 1 month

## 2022-07-29 NOTE — HISTORY OF PRESENT ILLNESS
[de-identified] : RAFAEL is a 46 year old female presenting for a monthly weight check prior to bariatric surgery. [de-identified] : No Interval Changes in the H and P\par USS of the Liver and GB= Gall Bladder Stones\par UGISeries= No Hiatal Hernia or GERD\par Stress Test This Morning and Cardiology Clearance ,after.\par Nutritionist and Pulmonology Clearances Obtained.\par EGD and GI Pending\par \par The patient continues to struggle with her weight.  And has been stable since the last visit.  She admits to eating only fast food, snacking on "junk food" in the evenings.  She says she is very sedentary.  "I watch TV all day and do not do anything."

## 2022-08-02 ENCOUNTER — APPOINTMENT (OUTPATIENT)
Dept: ULTRASOUND IMAGING | Facility: IMAGING CENTER | Age: 47
End: 2022-08-02

## 2022-08-02 ENCOUNTER — RESULT REVIEW (OUTPATIENT)
Age: 47
End: 2022-08-02

## 2022-08-02 ENCOUNTER — OUTPATIENT (OUTPATIENT)
Dept: OUTPATIENT SERVICES | Facility: HOSPITAL | Age: 47
LOS: 1 days | End: 2022-08-02
Payer: MEDICARE

## 2022-08-02 ENCOUNTER — APPOINTMENT (OUTPATIENT)
Dept: MAMMOGRAPHY | Facility: IMAGING CENTER | Age: 47
End: 2022-08-02

## 2022-08-02 DIAGNOSIS — Z01.419 ENCOUNTER FOR GYNECOLOGICAL EXAMINATION (GENERAL) (ROUTINE) WITHOUT ABNORMAL FINDINGS: ICD-10-CM

## 2022-08-02 DIAGNOSIS — Z12.39 ENCOUNTER FOR OTHER SCREENING FOR MALIGNANT NEOPLASM OF BREAST: ICD-10-CM

## 2022-08-02 PROCEDURE — 77063 BREAST TOMOSYNTHESIS BI: CPT

## 2022-08-02 PROCEDURE — 77067 SCR MAMMO BI INCL CAD: CPT

## 2022-08-02 PROCEDURE — 77067 SCR MAMMO BI INCL CAD: CPT | Mod: 26

## 2022-08-02 PROCEDURE — 77063 BREAST TOMOSYNTHESIS BI: CPT | Mod: 26

## 2022-08-02 PROCEDURE — 76641 ULTRASOUND BREAST COMPLETE: CPT

## 2022-08-02 PROCEDURE — 76641 ULTRASOUND BREAST COMPLETE: CPT | Mod: 26,50

## 2022-08-10 ENCOUNTER — APPOINTMENT (OUTPATIENT)
Dept: OBGYN | Facility: CLINIC | Age: 47
End: 2022-08-10

## 2022-08-10 PROCEDURE — 99212 OFFICE O/P EST SF 10 MIN: CPT | Mod: 95

## 2022-08-10 NOTE — HISTORY OF PRESENT ILLNESS
[Medical Office: ([facility])___] : at the medical office located in  [Verbal consent obtained from patient] : the patient, [unfilled] [N] : Patient is not sexually active [Y] : Positive pregnancy history [No] : Patient does not have concerns regarding sex [Previously active] : previously active [Men] : men [Vaginal] : vaginal [Patient would like to be screened for STIs] : Patient would like to be screened for STIs [Mammogramdate] : 10/8/20 [TextBox_19] : NEG [PapSmeardate] : 7/15/22 [TextBox_31] : neg with trich [HIVDate] : 7/15/22 [TextBox_53] : NEG [SyphilisDate] : 7/15/22 [TextBox_58] : NEG [GonorrheaDate] : 7/15/22 [TextBox_63] : NEG [ChlamydiaDate] : 7/15/22 [TextBox_68] : NEG [HPVDate] : 7/15/22 [TextBox_78] : neg [HepatitisBDate] : 7/15/22 [TextBox_83] : NEG [HepatitisCDate] : 7/15/22 [TextBox_88] : NEG [LMPDate] : 11/27/20 [PGHxTotal] : 5 [[facility]] : 5 [[facility]] : 5 [PGHxABSpont] : 2 [FreeTextEntry1] : 11/27/20

## 2022-08-15 ENCOUNTER — APPOINTMENT (OUTPATIENT)
Dept: OBGYN | Facility: CLINIC | Age: 47
End: 2022-08-15

## 2022-08-15 VITALS
DIASTOLIC BLOOD PRESSURE: 89 MMHG | HEART RATE: 101 BPM | WEIGHT: 293 LBS | SYSTOLIC BLOOD PRESSURE: 155 MMHG | BODY MASS INDEX: 48.82 KG/M2 | HEIGHT: 65 IN

## 2022-08-15 DIAGNOSIS — R93.89 ABNORMAL FINDINGS ON DIAGNOSTIC IMAGING OF OTHER SPECIFIED BODY STRUCTURES: ICD-10-CM

## 2022-08-15 PROCEDURE — 58100 BIOPSY OF UTERUS LINING: CPT

## 2022-08-15 PROCEDURE — 99213 OFFICE O/P EST LOW 20 MIN: CPT | Mod: 25

## 2022-08-15 NOTE — PHYSICAL EXAM
[Labia Majora] : normal [Labia Minora] : normal [Normal] : normal [Anteversion] : anteverted [Uterine Adnexae] : non-palpable

## 2022-08-15 NOTE — PROCEDURE
[Endometrial Biopsy] : Endometrial biopsy [Time out performed] : Pre-procedure time out performed.  Patient's name, date of birth and procedure confirmed. [Consent Obtained] : Consent obtained [Thickened Endometrium] : thickened endometrium [Risks] : risks [Benefits] : benefits [Alternatives] : alternatives [Patient] : patient [Infection] : infection [Bleeding] : bleeding [Allergic Reaction] : allergic reaction [Uterine Perforation] : uterine perforation [Pain] : pain [Negative] : negative pregnancy test [No Premedication] : No premedication [Betadine] : Betadine [None] : none [Easy Passage] : Easy passage [Anteverted] : anteverted [Moderate] : moderate [Sent to Pathology] : placed in buffered formalin and sent for pathology [Tolerated Well] : Patient tolerated the procedure well [No Complications] : No complications

## 2022-08-15 NOTE — HISTORY OF PRESENT ILLNESS
[FreeTextEntry1] : pt presents for followup, for report of ultrasound showing thickened endometrium . pt is morbidly obese and is scheduled for bariatric surgery. pt is also oligomenorrheic , experiencing menstrual flow approx. once per year, leading to a suspicion of endometrial hyperplasia in association with a thickened endometrium seen on sono.

## 2022-08-15 NOTE — DISCUSSION/SUMMARY
[FreeTextEntry1] : A - Morbid Obesity\par       thickened endometrium\par        oligomenorrhea\par \par P- EMB done\par     pt to  follow up for results. \par

## 2022-08-18 ENCOUNTER — NON-APPOINTMENT (OUTPATIENT)
Age: 47
End: 2022-08-18

## 2022-08-19 LAB — CORE LAB BIOPSY: NORMAL

## 2022-08-22 ENCOUNTER — NON-APPOINTMENT (OUTPATIENT)
Age: 47
End: 2022-08-22

## 2022-08-24 ENCOUNTER — APPOINTMENT (OUTPATIENT)
Dept: OBGYN | Facility: CLINIC | Age: 47
End: 2022-08-24

## 2022-08-26 NOTE — REASON FOR VISIT
[Home] : at home, [unfilled] , at the time of the visit. [Medical Office: ([facility])___] : at the medical office located in  [Patient] : the patient [Follow-Up Visit] : a follow-up visit for [Morbid Obesity (BMI>40)] : morbid obesity (bmi>40)

## 2022-08-29 ENCOUNTER — APPOINTMENT (OUTPATIENT)
Dept: SURGERY | Facility: CLINIC | Age: 47
End: 2022-08-29

## 2022-08-29 PROCEDURE — 99212 OFFICE O/P EST SF 10 MIN: CPT | Mod: 95

## 2022-08-29 NOTE — PHYSICAL EXAM
[Obese, well nourished, in no acute distress] : obese, well nourished, in no acute distress [Normal] : affect appropriate [de-identified] : Nl respirations

## 2022-08-29 NOTE — HISTORY OF PRESENT ILLNESS
[de-identified] : RAFAEL is a 46 year old female presenting for a monthly weight check prior to bariatric surgery.\par No changes in medical history reported.  Continuing to moderate portion sizes and make more healthful choices.  Increasing activity levels as much as possible. \par Endoscopy and psych appointments are scheduled.\par

## 2022-08-31 ENCOUNTER — APPOINTMENT (OUTPATIENT)
Dept: PSYCHIATRY | Facility: CLINIC | Age: 47
End: 2022-08-31

## 2022-08-31 PROCEDURE — 99205 OFFICE O/P NEW HI 60 MIN: CPT

## 2022-09-09 ENCOUNTER — APPOINTMENT (OUTPATIENT)
Dept: OBGYN | Facility: CLINIC | Age: 47
End: 2022-09-09

## 2022-09-09 VITALS
SYSTOLIC BLOOD PRESSURE: 155 MMHG | BODY MASS INDEX: 48.82 KG/M2 | HEIGHT: 65 IN | WEIGHT: 293 LBS | HEART RATE: 91 BPM | DIASTOLIC BLOOD PRESSURE: 97 MMHG

## 2022-09-09 DIAGNOSIS — L29.2 PRURITUS VULVAE: ICD-10-CM

## 2022-09-09 PROCEDURE — 99213 OFFICE O/P EST LOW 20 MIN: CPT

## 2022-09-09 RX ORDER — METRONIDAZOLE 500 MG/1
500 TABLET ORAL TWICE DAILY
Qty: 14 | Refills: 0 | Status: DISCONTINUED | COMMUNITY
Start: 2022-07-22 | End: 2022-09-09

## 2022-09-09 NOTE — PHYSICAL EXAM
[Chaperone Present] : A chaperone was present in the examining room during all aspects of the physical examination [FreeTextEntry1] : inner anterior vulva with erythema and mild excoriations

## 2022-09-12 ENCOUNTER — APPOINTMENT (OUTPATIENT)
Dept: OBGYN | Facility: CLINIC | Age: 47
End: 2022-09-12

## 2022-09-12 LAB
CANDIDA VAG CYTO: NOT DETECTED
G VAGINALIS+PREV SP MTYP VAG QL MICRO: DETECTED
T VAGINALIS VAG QL WET PREP: DETECTED

## 2022-09-22 ENCOUNTER — TRANSCRIPTION ENCOUNTER (OUTPATIENT)
Age: 47
End: 2022-09-22

## 2022-09-22 ENCOUNTER — RESULT REVIEW (OUTPATIENT)
Age: 47
End: 2022-09-22

## 2022-09-22 ENCOUNTER — OUTPATIENT (OUTPATIENT)
Dept: OUTPATIENT SERVICES | Facility: HOSPITAL | Age: 47
LOS: 1 days | End: 2022-09-22
Payer: MEDICARE

## 2022-09-22 VITALS
DIASTOLIC BLOOD PRESSURE: 57 MMHG | SYSTOLIC BLOOD PRESSURE: 116 MMHG | HEART RATE: 91 BPM | RESPIRATION RATE: 19 BRPM | OXYGEN SATURATION: 98 %

## 2022-09-22 VITALS
RESPIRATION RATE: 16 BRPM | TEMPERATURE: 97 F | DIASTOLIC BLOOD PRESSURE: 64 MMHG | SYSTOLIC BLOOD PRESSURE: 137 MMHG | HEIGHT: 65 IN | OXYGEN SATURATION: 98 % | WEIGHT: 293 LBS | HEART RATE: 93 BPM

## 2022-09-22 DIAGNOSIS — R19.4 CHANGE IN BOWEL HABIT: ICD-10-CM

## 2022-09-22 DIAGNOSIS — Z98.890 OTHER SPECIFIED POSTPROCEDURAL STATES: Chronic | ICD-10-CM

## 2022-09-22 PROCEDURE — 88305 TISSUE EXAM BY PATHOLOGIST: CPT | Mod: 26

## 2022-09-22 PROCEDURE — 88305 TISSUE EXAM BY PATHOLOGIST: CPT

## 2022-09-22 PROCEDURE — 43239 EGD BIOPSY SINGLE/MULTIPLE: CPT

## 2022-09-22 DEVICE — NET RETRV ROT ROTH 2.5MMX230CM: Type: IMPLANTABLE DEVICE | Status: FUNCTIONAL

## 2022-09-22 NOTE — ASU PREOP CHECKLIST - AS TEMP SITE
Cataract OU: Observe for now without intervention.  The patient was advised to contact us if any change or worsening of vision forehead

## 2022-09-22 NOTE — ASU PREOP CHECKLIST - SITE MARKED BY ANESTHESIOLOGIST
-- Message is from the [facility]--    Reason for Call: Patient is requesting a call back from the office to clarify the directions for the Prednisone medication that was prescribed. Please call to discuss further.    Caller Information       Type Contact Phone    02/25/2019 03:08 PM Phone (Incoming) [facility] (Self) [phone] (M)          Alternative phone number: n/a    Turnaround time given to caller:   \"This message will be sent to [state Provider's name]. The clinical team will fulfill your request as soon as they review your message.\"     n/a

## 2022-09-22 NOTE — ASU PATIENT PROFILE, ADULT - FALL HARM RISK - UNIVERSAL INTERVENTIONS
Bed in lowest position, wheels locked, appropriate side rails in place/Call bell, personal items and telephone in reach/Instruct patient to call for assistance before getting out of bed or chair/Non-slip footwear when patient is out of bed/[place] to call system/Purposeful Proactive Rounding/Room/bathroom lighting operational, light cord in reach

## 2022-09-22 NOTE — ASU DISCHARGE PLAN (ADULT/PEDIATRIC) - NS MD DC FALL RISK RISK
For information on Fall & Injury Prevention, visit: [email or web address] OR  [email or web address] OR  https://www.cdc.gov/steadi/patient.html

## 2022-09-22 NOTE — PRE PROCEDURE NOTE - PRE PROCEDURE EVALUATION
Attending Physician:     Yayo Ragland MD                       Procedure: EGD    Indication for Procedure: Pre bariatric evaluation  ________________________________________________________  PAST MEDICAL & SURGICAL HISTORY:  Morbid Obesity      metorrhagia/Bleeding      Hypertension--diet and weight controlled  lost 15 pounds over 1.5 months      Murmur      personal h/o Iron Deficiency Anemia      Vertigo      personal h/o Fatty Liver documented on testing      No significant past surgical history        ALLERGIES:  codeine (Unknown)  IV contrast (Unknown)    HOME MEDICATIONS:  ibuprofen 600 mg oral tablet: 1 tab(s) orally every 6 hours, As needed, Mild-to-moderate pain or cramping    AICD/PPM: [ ] yes   [ x] no    PERTINENT LAB DATA:                      PHYSICAL EXAMINATION:    T(C): --  HR: --  BP: --  RR: --  SpO2: --    Constitutional: NAD  HEENT: PERRLA, EOMI,    Neck:  No JVD  Respiratory: CTAB/L  Cardiovascular: S1 and S2  Gastrointestinal: BS+, soft, NT/ND  Extremities: No peripheral edema  Neurological: A/O x 3, no focal deficits  Psychiatric: Normal mood, normal affect  Skin: No rashes    ASA Class: I [ ]  II [ ]  III [x ]  IV [ ]    COMMENTS:    The patient is a suitable candidate for the planned procedure unless box checked [ ]  No, explain:

## 2022-09-24 ENCOUNTER — OUTPATIENT (OUTPATIENT)
Dept: OUTPATIENT SERVICES | Facility: HOSPITAL | Age: 47
LOS: 1 days | End: 2022-09-24
Payer: MEDICARE

## 2022-09-24 ENCOUNTER — APPOINTMENT (OUTPATIENT)
Dept: CT IMAGING | Facility: CLINIC | Age: 47
End: 2022-09-24

## 2022-09-24 DIAGNOSIS — Z98.890 OTHER SPECIFIED POSTPROCEDURAL STATES: Chronic | ICD-10-CM

## 2022-09-24 DIAGNOSIS — Z00.8 ENCOUNTER FOR OTHER GENERAL EXAMINATION: ICD-10-CM

## 2022-09-24 PROCEDURE — 74177 CT ABD & PELVIS W/CONTRAST: CPT

## 2022-09-24 PROCEDURE — 74177 CT ABD & PELVIS W/CONTRAST: CPT | Mod: 26

## 2022-09-27 ENCOUNTER — TRANSCRIPTION ENCOUNTER (OUTPATIENT)
Age: 47
End: 2022-09-27

## 2022-09-27 LAB — SURGICAL PATHOLOGY STUDY: SIGNIFICANT CHANGE UP

## 2022-09-29 NOTE — REASON FOR VISIT
[Home] : at home, [unfilled] , at the time of the visit. [Medical Office: ([facility])___] : at the medical office located in  [Follow-Up Visit] : a follow-up visit for

## 2022-09-30 ENCOUNTER — APPOINTMENT (OUTPATIENT)
Dept: SURGERY | Facility: CLINIC | Age: 47
End: 2022-09-30

## 2022-09-30 PROCEDURE — 99213 OFFICE O/P EST LOW 20 MIN: CPT | Mod: 95

## 2022-09-30 NOTE — PLAN
[FreeTextEntry1] : Will refer to hepatologist\par Reminded that adequate and restful sleep are important to reduce cravings.\par Portion control will help reduce reflux symptoms.  \par Discussed adding low-sugar protein shakes as a meal replacement once per day in preparation for bariatric diet.\par \par The patient expressed good understanding and is committed to learning and incorporating diet and exercise modifications to optimize success after bariatric surgery. \par \par F/u after hepatology evaluation.

## 2022-09-30 NOTE — HISTORY OF PRESENT ILLNESS
June 29, 2022      Everton Quevedo  [address] [place] [id number]        Dear SusieSae,    We are writing to inform you of your test results.    Normal      Resulted Orders   Lipid Profile (Chol, Trig, HDL, LDL calc)   Result Value Ref Range    Cholesterol 142 <200 mg/dL    Triglycerides 74 <150 mg/dL    Direct Measure HDL 39 (L) >=40 mg/dL    LDL Cholesterol Calculated 88 <=100 mg/dL    Non HDL Cholesterol 103 <130 mg/dL    Patient Fasting > 8hrs? No     Narrative    Cholesterol  Desirable:  <200 mg/dL    Triglycerides  Normal:  Less than 150 mg/dL  Borderline High:  150-199 mg/dL  High:  200-499 mg/dL  Very High:  Greater than or equal to 500 mg/dL    Direct Measure HDL  Female:  Greater than or equal to 50 mg/dL   Male:  Greater than or equal to 40 mg/dL    LDL Cholesterol  Desirable:  <100mg/dL  Above Desirable:  100-129 mg/dL   Borderline High:  130-159 mg/dL   High:  160-189 mg/dL   Very High:  >= 190 mg/dL    Non HDL Cholesterol  Desirable:  130 mg/dL  Above Desirable:  130-159 mg/dL  Borderline High:  160-189 mg/dL  High:  190-219 mg/dL  Very High:  Greater than or equal to 220 mg/dL   CBC with platelets and differential   Result Value Ref Range    WBC Count 8.9 4.0 - 11.0 10e3/uL    RBC Count 5.13 4.40 - 5.90 10e6/uL    Hemoglobin 16.4 13.3 - 17.7 g/dL    Hematocrit 45.3 40.0 - 53.0 %    MCV 88 78 - 100 fL    MCH 32.0 26.5 - 33.0 pg    MCHC 36.2 31.5 - 36.5 g/dL    RDW 13.7 10.0 - 15.0 %    Platelet Count 187 150 - 450 10e3/uL    % Neutrophils 55 %    % Lymphocytes 37 %    % Monocytes 6 %    % Eosinophils 1 %    % Basophils 0 %    Absolute Neutrophils 4.9 1.6 - 8.3 10e3/uL    Absolute Lymphocytes 3.3 0.8 - 5.3 10e3/uL    Absolute Monocytes 0.6 0.0 - 1.3 10e3/uL    Absolute Eosinophils 0.1 0.0 - 0.7 10e3/uL    Absolute Basophils 0.0 0.0 - 0.2 10e3/uL       If you have any questions or concerns, please call the clinic at the number listed above.       Sincerely,      Lorena Blake CNP          
[de-identified] : RAFAEL is a 46 year old female presenting for a monthly weight check prior to bariatric surgery. \par Continuing to moderate portion sizes and make more healthful choices.  Increasing activity levels as much as possible. \par Completed preoperative workup.  \par \par On her upper endoscopy, findings were notable for LA grade B esophagitis in the lower third of the esophagus, as well as presence of grade 1 varix.\par The patient does have known history of fatty liver disease.\par CT scan ordered by the gastroenterologist did not demonstrate a cirrhotic appearance of the liver.

## 2022-09-30 NOTE — ASSESSMENT
[FreeTextEntry1] : 46-year-old female with longstanding history of morbid obesity (BMI 60) with comorbidities of hypertension, hyperlipidemia, and prediabetes.  She presents for continuing medical weight management in preparation for planned bariatric surgery.  \par \par Upper endoscopy performed on 9/22 was notable for LA grade B esophagitis in the lower third of the esophagus, as well as the presence of possible grade 1 varix.\par CT scan did not demonstrate cirrhotic appearance of the liver.\par \par Given these findings, I have recommended follow-up with hepatologist for evaluation of liver function and appropriateness for surgery.  Treatment of esophagitis with PPI is recommended.\par \par

## 2022-10-04 ENCOUNTER — APPOINTMENT (OUTPATIENT)
Dept: OBGYN | Facility: CLINIC | Age: 47
End: 2022-10-04

## 2022-10-04 VITALS
DIASTOLIC BLOOD PRESSURE: 74 MMHG | WEIGHT: 293 LBS | SYSTOLIC BLOOD PRESSURE: 148 MMHG | HEIGHT: 65 IN | HEART RATE: 82 BPM | BODY MASS INDEX: 48.82 KG/M2

## 2022-10-04 DIAGNOSIS — A59.01 TRICHOMONAL VULVOVAGINITIS: ICD-10-CM

## 2022-10-04 PROCEDURE — 99213 OFFICE O/P EST LOW 20 MIN: CPT

## 2022-10-04 NOTE — HISTORY OF PRESENT ILLNESS
[FreeTextEntry1] : pt presents for follow up, , pt had evidence of Trichomonas on pap, but has not taken Flagyl yet, as prescribed by [facility] Solorzano, as pt was in the middle of full evaluation for bariatric surgery, pt has had evaluation by Hepatologist today, who has cleared pt to take Flagyl. \par pt is now cleared to schedule Gastric sleeve  with

## 2022-10-12 DIAGNOSIS — K80.20 CALCULUS OF GALLBLADDER W/OUT CHOLECYSTITIS W/OUT OBSTRUCTION: ICD-10-CM

## 2022-10-21 ENCOUNTER — APPOINTMENT (OUTPATIENT)
Dept: HEPATOLOGY | Facility: CLINIC | Age: 47
End: 2022-10-21

## 2022-10-26 ENCOUNTER — APPOINTMENT (OUTPATIENT)
Dept: HEPATOLOGY | Facility: CLINIC | Age: 47
End: 2022-10-26

## 2022-11-01 ENCOUNTER — OUTPATIENT (OUTPATIENT)
Dept: OUTPATIENT SERVICES | Facility: HOSPITAL | Age: 47
LOS: 1 days | End: 2022-11-01
Payer: MEDICARE

## 2022-11-01 VITALS
HEIGHT: 65 IN | OXYGEN SATURATION: 96 % | TEMPERATURE: 98 F | SYSTOLIC BLOOD PRESSURE: 138 MMHG | DIASTOLIC BLOOD PRESSURE: 82 MMHG | HEART RATE: 90 BPM | WEIGHT: 293 LBS | RESPIRATION RATE: 16 BRPM

## 2022-11-01 DIAGNOSIS — Z98.890 OTHER SPECIFIED POSTPROCEDURAL STATES: Chronic | ICD-10-CM

## 2022-11-01 DIAGNOSIS — K80.20 CALCULUS OF GALLBLADDER WITHOUT CHOLECYSTITIS WITHOUT OBSTRUCTION: ICD-10-CM

## 2022-11-01 DIAGNOSIS — E66.01 MORBID (SEVERE) OBESITY DUE TO EXCESS CALORIES: ICD-10-CM

## 2022-11-01 DIAGNOSIS — Z01.818 ENCOUNTER FOR OTHER PREPROCEDURAL EXAMINATION: ICD-10-CM

## 2022-11-01 LAB
A1C WITH ESTIMATED AVERAGE GLUCOSE RESULT: 6.7 % — HIGH (ref 4–5.6)
ALBUMIN SERPL ELPH-MCNC: 4.3 G/DL — SIGNIFICANT CHANGE UP (ref 3.3–5)
ALP SERPL-CCNC: 88 U/L — SIGNIFICANT CHANGE UP (ref 40–120)
ALT FLD-CCNC: 62 U/L — HIGH (ref 10–45)
ANION GAP SERPL CALC-SCNC: 13 MMOL/L — SIGNIFICANT CHANGE UP (ref 5–17)
AST SERPL-CCNC: 96 U/L — HIGH (ref 10–40)
BILIRUB SERPL-MCNC: 0.1 MG/DL — LOW (ref 0.2–1.2)
BLD GP AB SCN SERPL QL: NEGATIVE — SIGNIFICANT CHANGE UP
BUN SERPL-MCNC: 11 MG/DL — SIGNIFICANT CHANGE UP (ref 7–23)
CALCIUM SERPL-MCNC: 9.3 MG/DL — SIGNIFICANT CHANGE UP (ref 8.4–10.5)
CHLORIDE SERPL-SCNC: 103 MMOL/L — SIGNIFICANT CHANGE UP (ref 96–108)
CO2 SERPL-SCNC: 23 MMOL/L — SIGNIFICANT CHANGE UP (ref 22–31)
CREAT SERPL-MCNC: 0.53 MG/DL — SIGNIFICANT CHANGE UP (ref 0.5–1.3)
EGFR: 116 ML/MIN/1.73M2 — SIGNIFICANT CHANGE UP
ESTIMATED AVERAGE GLUCOSE: 146 MG/DL — HIGH (ref 68–114)
GLUCOSE SERPL-MCNC: 106 MG/DL — HIGH (ref 70–99)
HCT VFR BLD CALC: 41.1 % — SIGNIFICANT CHANGE UP (ref 34.5–45)
HGB BLD-MCNC: 12.9 G/DL — SIGNIFICANT CHANGE UP (ref 11.5–15.5)
MCHC RBC-ENTMCNC: 27.2 PG — SIGNIFICANT CHANGE UP (ref 27–34)
MCHC RBC-ENTMCNC: 31.4 GM/DL — LOW (ref 32–36)
MCV RBC AUTO: 86.7 FL — SIGNIFICANT CHANGE UP (ref 80–100)
NRBC # BLD: 0 /100 WBCS — SIGNIFICANT CHANGE UP (ref 0–0)
PLATELET # BLD AUTO: 285 K/UL — SIGNIFICANT CHANGE UP (ref 150–400)
POTASSIUM SERPL-MCNC: 4.1 MMOL/L — SIGNIFICANT CHANGE UP (ref 3.5–5.3)
POTASSIUM SERPL-SCNC: 4.1 MMOL/L — SIGNIFICANT CHANGE UP (ref 3.5–5.3)
PROT SERPL-MCNC: 8.2 G/DL — SIGNIFICANT CHANGE UP (ref 6–8.3)
RBC # BLD: 4.74 M/UL — SIGNIFICANT CHANGE UP (ref 3.8–5.2)
RBC # FLD: 13.9 % — SIGNIFICANT CHANGE UP (ref 10.3–14.5)
RH IG SCN BLD-IMP: POSITIVE — SIGNIFICANT CHANGE UP
SODIUM SERPL-SCNC: 139 MMOL/L — SIGNIFICANT CHANGE UP (ref 135–145)
WBC # BLD: 10.77 K/UL — HIGH (ref 3.8–10.5)
WBC # FLD AUTO: 10.77 K/UL — HIGH (ref 3.8–10.5)

## 2022-11-01 PROCEDURE — 80053 COMPREHEN METABOLIC PANEL: CPT

## 2022-11-01 PROCEDURE — G0463: CPT

## 2022-11-01 PROCEDURE — 86900 BLOOD TYPING SEROLOGIC ABO: CPT

## 2022-11-01 PROCEDURE — 86850 RBC ANTIBODY SCREEN: CPT

## 2022-11-01 PROCEDURE — 86901 BLOOD TYPING SEROLOGIC RH(D): CPT

## 2022-11-01 PROCEDURE — 83036 HEMOGLOBIN GLYCOSYLATED A1C: CPT

## 2022-11-01 PROCEDURE — 85027 COMPLETE CBC AUTOMATED: CPT

## 2022-11-01 NOTE — H&P PST ADULT - NSANTHOSAYNRD_GEN_A_CORE
No. NICOLÁS screening performed.  STOP BANG Legend: 0-2 = LOW Risk; 3-4 = INTERMEDIATE Risk; 5-8 = HIGH Risk Yes

## 2022-11-01 NOTE — H&P PST ADULT - NSICDXPASTSURGICALHX_GEN_ALL_CORE_FT
PAST SURGICAL HISTORY:  History of D&C 2011     PAST SURGICAL HISTORY:  History of D&C 2011    S/P endoscopy 10/4/22 - to assess esophageal varices - no treatment at this time - stable

## 2022-11-01 NOTE — H&P PST ADULT - HISTORY OF PRESENT ILLNESS
This is a 34 y/o morbidly obese white female who presents with metorrhagia. Scheduled for D&C, hysteroscopy  LMP 2021    2021 - with symptoms of runny nose, body aches, fever 47 yo female, PMH morbid obesity,   This is a 34 y/o morbidly obese white female who presents with metorrhagia. Scheduled for D&C, hysteroscopy  LMP 2021    2021 - with symptoms of runny nose, body aches, fever 47 yo female, PMH morbid obesity, moderate NICOLÁS - no CPAP, ENGLISH on exertion due to weight, has tried many diet modalities without success, presents to [facility] for scheduled Laparoscopic Sleeve Gastrectomy, Cholecystectomy 11/15/22. Pt.had COVID-19 infection on 12/26/21 with symptoms of fever, body aches and runny nose. Denies recent fever, chills, chest pain, SOB, changes in bowel/urinary habits.    Pt. is scheduled for COVID-19 testing on 11/12 at [facility]

## 2022-11-01 NOTE — H&P PST ADULT - PROBLEM SELECTOR PLAN 1
Laparoscopic Sleeve Gastrectomy, Cholecystectomy on 11/15/22  Pre-op instructions, including Chlorhexidene soap and I/S provided - all questions answered  [facility]

## 2022-11-01 NOTE — H&P PST ADULT - REASON FOR ADMISSION
they are doing surgery because I have been bleeding and found something on sonogram "sleeve gastrectomy"

## 2022-11-01 NOTE — H&P PST ADULT - LIVES WITH, PROFILE
mother, step-father and 2 children/children/parents lives with mother, step-father and 2 children/children/parents

## 2022-11-01 NOTE — H&P PST ADULT - ASSESSMENT
SAIMAI VTE 2.0 SCORE [CLOT updated 2019]    AGE RELATED RISK FACTORS                                                       MOBILITY RELATED FACTORS  [x ] Age 41-60 years                                            (1 Point)                    [ ] Bed rest                                                        (1 Point)  [ ] Age: 61-74 years                                           (2 Points)                  [ ] Plaster cast                                                   (2 Points)  [ ] Age= 75 years                                              (3 Points)                    [ ] Bed bound for more than 72 hours                 (2 Points)    DISEASE RELATED RISK FACTORS                                               GENDER SPECIFIC FACTORS  [x ] Edema in the lower extremities                       (1 Point)              [ ] Pregnancy                                                     (1 Point)  [ ] Varicose veins                                               (1 Point)                     [ ] Post-partum < 6 weeks                                   (1 Point)             [ x] BMI > 25 Kg/m2                                            (1 Point)                     [ ] Hormonal therapy  or oral contraception          (1 Point)                 [ ] Sepsis (in the previous month)                        (1 Point)               [ ] History of pregnancy complications                 (1 point)  [ ] Pneumonia or serious lung disease                                               [ ] Unexplained or recurrent                     (1 Point)           (in the previous month)                               (1 Point)  [ ] Abnormal pulmonary function test                     (1 Point)                 SURGERY RELATED RISK FACTORS  [ ] Acute myocardial infarction                              (1 Point)               [ ]  Section                                             (1 Point)  [ ] Congestive heart failure (in the previous month)  (1 Point)      [ ] Minor surgery                                                  (1 Point)   [ ] Inflammatory bowel disease                             (1 Point)               [ ] Arthroscopic surgery                                        (2 Points)  [ ] Central venous access                                      (2 Points)                [x ] General surgery lasting more than 45 minutes (2 points)  [ ] Malignancy- Present or previous                   (2 Points)                [ ] Elective arthroplasty                                         (5 points)    [ ] Stroke (in the previous month)                          (5 Points)                                                                                                                                                           HEMATOLOGY RELATED FACTORS                                                 TRAUMA RELATED RISK FACTORS  [ ] Prior episodes of VTE                                     (3 Points)                [ ] Fracture of the hip, pelvis, or leg                       (5 Points)  [ ] Positive family history for VTE                         (3 Points)             [ ] Acute spinal cord injury (in the previous month)  (5 Points)  [ ] Prothrombin 52846 A                                     (3 Points)               [ ] Paralysis  (less than 1 month)                             (5 Points)  [ ] Factor V Leiden                                             (3 Points)                  [ ] Multiple Trauma within 1 month                        (5 Points)  [ ] Lupus anticoagulants                                     (3 Points)                                                           [ ] Anticardiolipin antibodies                               (3 Points)                                                       [ ] High homocysteine in the blood                      (3 Points)                                             [ ] Other congenital or acquired thrombophilia      (3 Points)                                                [ ] Heparin induced thrombocytopenia                  (3 Points)                                     Total Score [   5       ]

## 2022-11-01 NOTE — H&P PST ADULT - NEGATIVE ENMT SYMPTOMS
no sinus symptoms/no throat pain/no dysphagia no hearing difficulty/no vertigo/no sinus symptoms/no throat pain/no dysphagia

## 2022-11-01 NOTE — H&P PST ADULT - NSICDXPASTMEDICALHX_GEN_ALL_CORE_FT
PAST MEDICAL HISTORY:  Hypertension--diet and weight controlled lost 15 pounds over 1.5 months    metorrhagia/Bleeding     Morbid Obesity     Murmur     personal h/o Fatty Liver documented on testing     personal h/o Iron Deficiency Anemia     Vertigo      PAST MEDICAL HISTORY:  metorrhagia/Bleeding s/p D&C    Morbid Obesity     personal h/o Fatty Liver documented on testing     personal h/o Iron Deficiency Anemia     Vertigo last episode 2019     PAST MEDICAL HISTORY:  H/O cholelithiasis     H/O Helicobacter infection 2018 and treated    H/O trichomoniasis treated 10/2022    metorrhagia/Bleeding s/p D&C    Morbid Obesity BMI 64.2    NICOLÁS (obstructive sleep apnea) moderate - no CPAP    personal h/o Fatty Liver documented on testing     personal h/o Iron Deficiency Anemia     Vertigo last episode 2019     PAST MEDICAL HISTORY:  COVID-19 virus infection 12/2021 fever, hedaches and runny nose    H/O cholelithiasis     H/O Helicobacter infection 2018 and treated    H/O trichomoniasis treated 10/2022    metorrhagia/Bleeding s/p D&C    Morbid Obesity BMI 64.2    NICOLÁS (obstructive sleep apnea) moderate - no CPAP    personal h/o Fatty Liver documented on testing     personal h/o Iron Deficiency Anemia     Vertigo last episode 2019

## 2022-11-01 NOTE — H&P PST ADULT - NEGATIVE CARDIOVASCULAR SYMPTOMS
Reviewed colonoscopy report from September and December 2012 and left voicemail with her that she needs to schedule a repeat colonoscopy as she was supposed to repeat in 3 years. Referral to Dr. Phu Núñez placed. no chest pain/no palpitations

## 2022-11-02 ENCOUNTER — OUTPATIENT (OUTPATIENT)
Dept: OUTPATIENT SERVICES | Facility: HOSPITAL | Age: 47
LOS: 1 days | Discharge: HOME | End: 2022-11-02

## 2022-11-02 DIAGNOSIS — Z98.890 OTHER SPECIFIED POSTPROCEDURAL STATES: Chronic | ICD-10-CM

## 2022-11-02 DIAGNOSIS — E88.01 ALPHA-1-ANTITRYPSIN DEFICIENCY: ICD-10-CM

## 2022-11-03 PROBLEM — U07.1 COVID-19: Chronic | Status: ACTIVE | Noted: 2022-11-01

## 2022-11-03 PROBLEM — Z86.19 PERSONAL HISTORY OF OTHER INFECTIOUS AND PARASITIC DISEASES: Chronic | Status: ACTIVE | Noted: 2022-11-01

## 2022-11-03 PROBLEM — G47.33 OBSTRUCTIVE SLEEP APNEA (ADULT) (PEDIATRIC): Chronic | Status: ACTIVE | Noted: 2022-11-01

## 2022-11-07 ENCOUNTER — APPOINTMENT (OUTPATIENT)
Dept: SURGERY | Facility: CLINIC | Age: 47
End: 2022-11-07

## 2022-11-07 VITALS
DIASTOLIC BLOOD PRESSURE: 82 MMHG | OXYGEN SATURATION: 93 % | HEIGHT: 65 IN | BODY MASS INDEX: 48.82 KG/M2 | WEIGHT: 293 LBS | RESPIRATION RATE: 18 BRPM | TEMPERATURE: 97.8 F | HEART RATE: 88 BPM | SYSTOLIC BLOOD PRESSURE: 134 MMHG

## 2022-11-07 PROCEDURE — 99215 OFFICE O/P EST HI 40 MIN: CPT

## 2022-11-10 ENCOUNTER — RESULT REVIEW (OUTPATIENT)
Age: 47
End: 2022-11-10

## 2022-11-10 ENCOUNTER — NON-APPOINTMENT (OUTPATIENT)
Age: 47
End: 2022-11-10

## 2022-11-10 ENCOUNTER — APPOINTMENT (OUTPATIENT)
Dept: HEMATOLOGY ONCOLOGY | Facility: CLINIC | Age: 47
End: 2022-11-10

## 2022-11-10 VITALS
WEIGHT: 293 LBS | TEMPERATURE: 97.3 F | RESPIRATION RATE: 18 BRPM | HEART RATE: 95 BPM | HEIGHT: 65 IN | DIASTOLIC BLOOD PRESSURE: 81 MMHG | OXYGEN SATURATION: 98 % | SYSTOLIC BLOOD PRESSURE: 136 MMHG | BODY MASS INDEX: 48.82 KG/M2

## 2022-11-10 DIAGNOSIS — Z01.818 ENCOUNTER FOR OTHER PREPROCEDURAL EXAMINATION: ICD-10-CM

## 2022-11-10 DIAGNOSIS — R73.03 PREDIABETES.: ICD-10-CM

## 2022-11-10 DIAGNOSIS — I10 ESSENTIAL (PRIMARY) HYPERTENSION: ICD-10-CM

## 2022-11-10 LAB
BASOPHILS # BLD AUTO: 0.06 K/UL — SIGNIFICANT CHANGE UP (ref 0–0.2)
BASOPHILS NFR BLD AUTO: 0.6 % — SIGNIFICANT CHANGE UP (ref 0–2)
EOSINOPHIL # BLD AUTO: 0.19 K/UL — SIGNIFICANT CHANGE UP (ref 0–0.5)
EOSINOPHIL NFR BLD AUTO: 2 % — SIGNIFICANT CHANGE UP (ref 0–6)
HCT VFR BLD CALC: 40.5 % — SIGNIFICANT CHANGE UP (ref 34.5–45)
HGB BLD-MCNC: 12.7 G/DL — SIGNIFICANT CHANGE UP (ref 11.5–15.5)
IMM GRANULOCYTES NFR BLD AUTO: 0.4 % — SIGNIFICANT CHANGE UP (ref 0–0.9)
LYMPHOCYTES # BLD AUTO: 1.49 K/UL — SIGNIFICANT CHANGE UP (ref 1–3.3)
LYMPHOCYTES # BLD AUTO: 15.8 % — SIGNIFICANT CHANGE UP (ref 13–44)
MCHC RBC-ENTMCNC: 27 PG — SIGNIFICANT CHANGE UP (ref 27–34)
MCHC RBC-ENTMCNC: 31.4 G/DL — LOW (ref 32–36)
MCV RBC AUTO: 86 FL — SIGNIFICANT CHANGE UP (ref 80–100)
MONOCYTES # BLD AUTO: 0.75 K/UL — SIGNIFICANT CHANGE UP (ref 0–0.9)
MONOCYTES NFR BLD AUTO: 7.9 % — SIGNIFICANT CHANGE UP (ref 2–14)
NEUTROPHILS # BLD AUTO: 6.92 K/UL — SIGNIFICANT CHANGE UP (ref 1.8–7.4)
NEUTROPHILS NFR BLD AUTO: 73.3 % — SIGNIFICANT CHANGE UP (ref 43–77)
NRBC # BLD: 0 /100 WBCS — SIGNIFICANT CHANGE UP (ref 0–0)
PLATELET # BLD AUTO: 278 K/UL — SIGNIFICANT CHANGE UP (ref 150–400)
RBC # BLD: 4.71 M/UL — SIGNIFICANT CHANGE UP (ref 3.8–5.2)
RBC # FLD: 13.6 % — SIGNIFICANT CHANGE UP (ref 10.3–14.5)
WBC # BLD: 9.45 K/UL — SIGNIFICANT CHANGE UP (ref 3.8–10.5)
WBC # FLD AUTO: 9.45 K/UL — SIGNIFICANT CHANGE UP (ref 3.8–10.5)

## 2022-11-10 PROCEDURE — 99204 OFFICE O/P NEW MOD 45 MIN: CPT

## 2022-11-10 PROCEDURE — 99205 OFFICE O/P NEW HI 60 MIN: CPT

## 2022-11-10 RX ORDER — MECLIZINE HYDROCHLORIDE 12.5 MG/1
12.5 TABLET ORAL
Qty: 30 | Refills: 0 | Status: DISCONTINUED | COMMUNITY
Start: 2022-05-24 | End: 2022-11-10

## 2022-11-10 RX ORDER — ERGOCALCIFEROL 1.25 MG/1
1.25 MG CAPSULE, LIQUID FILLED ORAL
Qty: 12 | Refills: 0 | Status: DISCONTINUED | COMMUNITY
Start: 2022-05-24 | End: 2022-11-10

## 2022-11-10 RX ORDER — METRONIDAZOLE 500 MG/1
500 TABLET ORAL TWICE DAILY
Qty: 14 | Refills: 0 | Status: DISCONTINUED | COMMUNITY
Start: 2022-09-12 | End: 2022-11-10

## 2022-11-10 RX ORDER — METRONIDAZOLE 500 MG/1
500 TABLET ORAL
Qty: 4 | Refills: 0 | Status: DISCONTINUED | COMMUNITY
Start: 2022-10-04 | End: 2022-11-10

## 2022-11-10 RX ORDER — NYSTATIN 100000 U/G
100000 OINTMENT TOPICAL
Qty: 1 | Refills: 2 | Status: DISCONTINUED | COMMUNITY
Start: 2022-09-09 | End: 2022-11-10

## 2022-11-10 NOTE — ASSESSMENT
[FreeTextEntry1] : BRIANNA Hunt is a 46 year old female with significant metabolic syndrome with moderate blood pressure elevation; morbid obesity; pre diabetes , esophageal varices; abnormal liver function testing and probably fatty liver. No history of bleeding or spontaneously hemorrhage. Physical examination as noted above; morbid obesity without signs or symptoms of pneumonia asthma or abdominal distress. No caput medusae.\par CBC normal and coagulation studies requested ; patient will have pre operative testing. She is clear form hematology point of view for bariatric surgery.\par  [Palliative Care Plan] : not applicable at this time

## 2022-11-10 NOTE — HISTORY OF PRESENT ILLNESS
[de-identified] : 45 yo F with a PMH of vertigo, morbid obesity, HTN, HLD, and prediabetes who presents for initial consultation.\par She states she was here because she had elevated alpha and beta globulins.\par From prior notes, she was sent as a preoperative clearance prior to bariatric surgery.\par \par Of note, she has been obese since at least age 19, when she was ~ 180 lbs, and increased after her pregnancy at that time to 250, and she was largely at that weight for\par  10 -15 years. within past 2 years weight has exceeded 300 lbs; today 11/10/2022: weight 170.7 kg\par About 2 years ago, she was told she may have a fatty liver because she was found to have transaminitis.\par She had an EGD in preparation for bariatric surgery that showed grade B esophagitis and questionable grade I varices. She was referred to a hepatologist for further testing to r/o cirrhosis, which was ruled out.\par During preoperative testing, she was found to have a minimally elevated alpha and beta globulin levels with minimally elevated IgA levels.\par She denies any fevers, chills, night sweats, arthritis or arthritic pains, nausea/vomiting, diarrhea, constipation, CP, SOB, cough, rash, or itch. She has some slight swelling of her legs.\par She is scheduled for her bariatric surgery in 5 days. [FreeTextEntry1] : first visit [de-identified] : See HPI; she is referred by gastric surgery for review of labs. Patient has pre diabetes, mild hypertension, elevated liver function studies probable fatty liver [Date: ____________] : Patient's last distress assessment performed on [unfilled]. [1 - Distress Level] : Distress Level: 1 [90: Able to carry normal activity; minor signs or symptoms of disease.] : 90: Able to carry normal activity; minor signs or symptoms of disease.  [ECOG Performance Status: 1 - Restricted in physically strenuous activity but ambulatory and able to carry out work of a light or sedentary nature] : Performance Status: 1 - Restricted in physically strenuous activity but ambulatory and able to carry out work of a light or sedentary nature, e.g., light house work, office work

## 2022-11-10 NOTE — RESULTS/DATA
[FreeTextEntry1] : review of data in [facility]; including elevated hemoglobin A 1 C; normal CBC with normal platelet count in July and currently; normal serum IgG;  Ig M and IgA levels reviewed.\par interpretation of SPEP shows reactive proteins to inflamation

## 2022-11-10 NOTE — PHYSICAL EXAM
[Restricted in physically strenuous activity but ambulatory and able to carry out work of a light or sedentary nature] : Status 1- Restricted in physically strenuous activity but ambulatory and able to carry out work of a light or sedentary nature, e.g., light house work, office work [Normal] : affect appropriate [Obese] : obese [de-identified] : morbidly obese

## 2022-11-10 NOTE — END OF VISIT
[] : Fellow [FreeTextEntry3] : Patient with metabolic syndrome and possibly hyper inflammatory state;SPEP does not show monoclonal gammopathy; she has severe obesity and she is cleared form standpoint of hematology for bariatric surgery procedure [Time Spent: ___ minutes] : I have spent [unfilled] minutes of time on the encounter.

## 2022-11-10 NOTE — REASON FOR VISIT
[Initial Consultation] : an initial consultation for [FreeTextEntry2] : Elevated alpha and beta globulinemia

## 2022-11-10 NOTE — REVIEW OF SYSTEMS
[Recent Change In Weight] : ~T recent weight change [Negative] : Allergic/Immunologic [FreeTextEntry2] : gain to over 300 lbs (now in range of 350 lbs in past 2 years)

## 2022-11-12 ENCOUNTER — OUTPATIENT (OUTPATIENT)
Dept: OUTPATIENT SERVICES | Facility: HOSPITAL | Age: 47
LOS: 1 days | End: 2022-11-12
Payer: MEDICARE

## 2022-11-12 DIAGNOSIS — Z11.52 ENCOUNTER FOR SCREENING FOR COVID-19: ICD-10-CM

## 2022-11-12 DIAGNOSIS — Z98.890 OTHER SPECIFIED POSTPROCEDURAL STATES: Chronic | ICD-10-CM

## 2022-11-12 LAB — SARS-COV-2 RNA SPEC QL NAA+PROBE: SIGNIFICANT CHANGE UP

## 2022-11-12 PROCEDURE — U0005: CPT

## 2022-11-12 PROCEDURE — C9803: CPT

## 2022-11-12 PROCEDURE — U0003: CPT

## 2022-11-15 ENCOUNTER — APPOINTMENT (OUTPATIENT)
Dept: SURGERY | Facility: HOSPITAL | Age: 47
End: 2022-11-15

## 2022-11-25 LAB
APTT BLD: 32.2 SEC
INR PPP: 1.12 RATIO
PT BLD: 13 SEC

## 2022-11-28 ENCOUNTER — APPOINTMENT (OUTPATIENT)
Dept: SURGERY | Facility: CLINIC | Age: 47
End: 2022-11-28

## 2023-01-10 ENCOUNTER — OUTPATIENT (OUTPATIENT)
Dept: OUTPATIENT SERVICES | Facility: HOSPITAL | Age: 48
LOS: 1 days | End: 2023-01-10
Payer: MEDICARE

## 2023-01-10 VITALS
HEIGHT: 65 IN | WEIGHT: 293 LBS | DIASTOLIC BLOOD PRESSURE: 85 MMHG | RESPIRATION RATE: 14 BRPM | SYSTOLIC BLOOD PRESSURE: 125 MMHG | HEART RATE: 89 BPM | TEMPERATURE: 97 F | OXYGEN SATURATION: 97 %

## 2023-01-10 DIAGNOSIS — K80.20 CALCULUS OF GALLBLADDER WITHOUT CHOLECYSTITIS WITHOUT OBSTRUCTION: ICD-10-CM

## 2023-01-10 DIAGNOSIS — Z01.818 ENCOUNTER FOR OTHER PREPROCEDURAL EXAMINATION: ICD-10-CM

## 2023-01-10 DIAGNOSIS — Z98.890 OTHER SPECIFIED POSTPROCEDURAL STATES: Chronic | ICD-10-CM

## 2023-01-10 DIAGNOSIS — E66.01 MORBID (SEVERE) OBESITY DUE TO EXCESS CALORIES: ICD-10-CM

## 2023-01-10 DIAGNOSIS — Z29.9 ENCOUNTER FOR PROPHYLACTIC MEASURES, UNSPECIFIED: ICD-10-CM

## 2023-01-10 LAB
A1C WITH ESTIMATED AVERAGE GLUCOSE RESULT: 6.4 % — HIGH (ref 4–5.6)
ALBUMIN SERPL ELPH-MCNC: 4.2 G/DL — SIGNIFICANT CHANGE UP (ref 3.3–5)
ALP SERPL-CCNC: 79 U/L — SIGNIFICANT CHANGE UP (ref 40–120)
ALT FLD-CCNC: 66 U/L — HIGH (ref 10–45)
ANION GAP SERPL CALC-SCNC: 13 MMOL/L — SIGNIFICANT CHANGE UP (ref 5–17)
AST SERPL-CCNC: 58 U/L — HIGH (ref 10–40)
BILIRUB SERPL-MCNC: 0.2 MG/DL — SIGNIFICANT CHANGE UP (ref 0.2–1.2)
BLD GP AB SCN SERPL QL: NEGATIVE — SIGNIFICANT CHANGE UP
BUN SERPL-MCNC: 11 MG/DL — SIGNIFICANT CHANGE UP (ref 7–23)
CALCIUM SERPL-MCNC: 10.1 MG/DL — SIGNIFICANT CHANGE UP (ref 8.4–10.5)
CHLORIDE SERPL-SCNC: 102 MMOL/L — SIGNIFICANT CHANGE UP (ref 96–108)
CO2 SERPL-SCNC: 23 MMOL/L — SIGNIFICANT CHANGE UP (ref 22–31)
CREAT SERPL-MCNC: 0.66 MG/DL — SIGNIFICANT CHANGE UP (ref 0.5–1.3)
EGFR: 109 ML/MIN/1.73M2 — SIGNIFICANT CHANGE UP
ESTIMATED AVERAGE GLUCOSE: 137 MG/DL — HIGH (ref 68–114)
GLUCOSE SERPL-MCNC: 108 MG/DL — HIGH (ref 70–99)
HCT VFR BLD CALC: 42.2 % — SIGNIFICANT CHANGE UP (ref 34.5–45)
HGB BLD-MCNC: 12.9 G/DL — SIGNIFICANT CHANGE UP (ref 11.5–15.5)
MCHC RBC-ENTMCNC: 26.9 PG — LOW (ref 27–34)
MCHC RBC-ENTMCNC: 30.6 GM/DL — LOW (ref 32–36)
MCV RBC AUTO: 87.9 FL — SIGNIFICANT CHANGE UP (ref 80–100)
NRBC # BLD: 0 /100 WBCS — SIGNIFICANT CHANGE UP (ref 0–0)
PLATELET # BLD AUTO: 338 K/UL — SIGNIFICANT CHANGE UP (ref 150–400)
POTASSIUM SERPL-MCNC: 4 MMOL/L — SIGNIFICANT CHANGE UP (ref 3.5–5.3)
POTASSIUM SERPL-SCNC: 4 MMOL/L — SIGNIFICANT CHANGE UP (ref 3.5–5.3)
PROT SERPL-MCNC: 8 G/DL — SIGNIFICANT CHANGE UP (ref 6–8.3)
RBC # BLD: 4.8 M/UL — SIGNIFICANT CHANGE UP (ref 3.8–5.2)
RBC # FLD: 13.8 % — SIGNIFICANT CHANGE UP (ref 10.3–14.5)
RH IG SCN BLD-IMP: POSITIVE — SIGNIFICANT CHANGE UP
SODIUM SERPL-SCNC: 138 MMOL/L — SIGNIFICANT CHANGE UP (ref 135–145)
WBC # BLD: 11.26 K/UL — HIGH (ref 3.8–10.5)
WBC # FLD AUTO: 11.26 K/UL — HIGH (ref 3.8–10.5)

## 2023-01-10 PROCEDURE — G0463: CPT

## 2023-01-10 PROCEDURE — 86850 RBC ANTIBODY SCREEN: CPT

## 2023-01-10 PROCEDURE — 83036 HEMOGLOBIN GLYCOSYLATED A1C: CPT

## 2023-01-10 PROCEDURE — 36415 COLL VENOUS BLD VENIPUNCTURE: CPT

## 2023-01-10 PROCEDURE — 86901 BLOOD TYPING SEROLOGIC RH(D): CPT

## 2023-01-10 PROCEDURE — 80053 COMPREHEN METABOLIC PANEL: CPT

## 2023-01-10 PROCEDURE — 85027 COMPLETE CBC AUTOMATED: CPT

## 2023-01-10 PROCEDURE — 86900 BLOOD TYPING SEROLOGIC ABO: CPT

## 2023-01-10 NOTE — H&P PST ADULT - NSICDXPASTMEDICALHX_GEN_ALL_CORE_FT
PAST MEDICAL HISTORY:  COVID-19 virus infection 12/2021 fever, hedaches and runny nose    Fatty liver     H/O cholelithiasis     H/O Helicobacter infection 2018 and treated    H/O trichomoniasis treated 10/2022    HTN (hypertension)     metorrhagia/Bleeding s/p D&C    Morbid Obesity BMI 64.2    NICOLÁS (obstructive sleep apnea) moderate - no CPAP    personal h/o Fatty Liver documented on testing     personal h/o Iron Deficiency Anemia     Vertigo last episode 2019

## 2023-01-10 NOTE — H&P PST ADULT - PROBLEM SELECTOR PLAN 2
Procedure: Laparoscopic Sleeve Gastrectomy, Cholecystectomy 1/17/23  PST labs pending  AC: None  Medication changes: None  Medical evaluation pending-1/10/23  Covid swab 1/14/23

## 2023-01-10 NOTE — H&P PST ADULT - ASSESSMENT
DASI score: METS <4- 2/2 obesity   Mallampati score: 1  Denies loose teeth or dentures    CAPRINI VTE 2.0 SCORE [CLOT updated 2019]    AGE RELATED RISK FACTORS                                                       MOBILITY RELATED FACTORS  [X ] Age 41-60 years                                            (1 Point)                    [ ] Bed rest                                                        (1 Point)  [ ] Age: 61-74 years                                           (2 Points)                  [ ] Plaster cast                                                   (2 Points)  [ ] Age= 75 years                                              (3 Points)                    [ ] Bed bound for more than 72 hours                 (2 Points)    DISEASE RELATED RISK FACTORS                                               GENDER SPECIFIC FACTORS  [ ] Edema in the lower extremities                       (1 Point)              [ ] Pregnancy                                                     (1 Point)  [ ] Varicose veins                                               (1 Point)                     [ ] Post-partum < 6 weeks                                   (1 Point)             [ X] BMI > 25 Kg/m2                                            (1 Point)                     [ ] Hormonal therapy  or oral contraception          (1 Point)                 [ ] Sepsis (in the previous month)                        (1 Point)               [ ] History of pregnancy complications                 (1 point)  [ ] Pneumonia or serious lung disease                                               [ ] Unexplained or recurrent                     (1 Point)           (in the previous month)                               (1 Point)  [ ] Abnormal pulmonary function test                     (1 Point)                 SURGERY RELATED RISK FACTORS  [ ] Acute myocardial infarction                              (1 Point)               [ ]  Section                                             (1 Point)  [ ] Congestive heart failure (in the previous month)  (1 Point)      [ ] Minor surgery                                                  (1 Point)   [ ] Inflammatory bowel disease                             (1 Point)               [ ] Arthroscopic surgery                                        (2 Points)  [ ] Central venous access                                      (2 Points)                [X ] General surgery lasting more than 45 minutes (2 points)  [ ] Malignancy- Present or previous                   (2 Points)                [ ] Elective arthroplasty                                         (5 points)    [ ] Stroke (in the previous month)                          (5 Points)                                                                                                                                                           HEMATOLOGY RELATED FACTORS                                                 TRAUMA RELATED RISK FACTORS  [ ] Prior episodes of VTE                                     (3 Points)                [ ] Fracture of the hip, pelvis, or leg                       (5 Points)  [ ] Positive family history for VTE                         (3 Points)             [ ] Acute spinal cord injury (in the previous month)  (5 Points)  [ ] Prothrombin 09498 A                                     (3 Points)               [ ] Paralysis  (less than 1 month)                             (5 Points)  [ ] Factor V Leiden                                             (3 Points)                  [ ] Multiple Trauma within 1 month                        (5 Points)  [ ] Lupus anticoagulants                                     (3 Points)                                                           [ ] Anticardiolipin antibodies                               (3 Points)                                                       [ ] High homocysteine in the blood                      (3 Points)                                             [ ] Other congenital or acquired thrombophilia      (3 Points)                                                [ ] Heparin induced thrombocytopenia                  (3 Points)                                     Total Score [      4    ]

## 2023-01-10 NOTE — H&P PST ADULT - NSICDXPASTSURGICALHX_GEN_ALL_CORE_FT
PAST SURGICAL HISTORY:  History of D&C 2011    S/P endoscopy 10/4/22 - to assess esophageal varices - no treatment at this time - stable

## 2023-01-10 NOTE — H&P PST ADULT - HISTORY OF PRESENT ILLNESS
46 y/o F with PMHx of morbid obesity, moderate NICOLÁS (no CPAP), HTN, PreDM, tachycardia, cholelithiasis, elevated alpha and beta globulins (seen byHem/Onc- cleared for Sx). Presented to PST 11/2022 for scheduled Laparoscopic Sleeve Gastrectomy, Cholecystectomy 11/15/22.  Procedure was [id number] 2/2 URI.  Now rescheduled for 1/17/23.   ENGLISH at baseline 2/2 weight- presurgical cardiac work up in chart  Patient denies CP, palpitation, blood in the stool or urine, N/V/D/C, HA, dizziness, seizures.    Hx of Covid-19 Infx.-12/2021- Mild cold-like Sx- recovered at home  Covid swab on 1/14/23       46 y/o F with PMHx of morbid obesity, moderate NICOLÁS (no CPAP), HTN, PreDM, tachycardia, cholelithiasis, elevated alpha and beta globulins (seen byHem/Onc- cleared for Sx). Presented to PST 11/2022 for scheduled Laparoscopic Sleeve Gastrectomy, Cholecystectomy 11/15/22.  Procedure postponed 2/2 URI.  Now rescheduled for 1/17/23.   ENGLISH at baseline 2/2 weight- presurgical cardiac work up in chart  Patient denies CP, palpitation, blood in the stool or urine, N/V/D/C, HA, dizziness, seizures.    Hx of Covid-19 Infx.-12/2021- Mild cold-like Sx- recovered at home  Covid swab on 1/14/23

## 2023-01-14 ENCOUNTER — OUTPATIENT (OUTPATIENT)
Dept: OUTPATIENT SERVICES | Facility: HOSPITAL | Age: 48
LOS: 1 days | End: 2023-01-14
Payer: MEDICARE

## 2023-01-14 DIAGNOSIS — Z98.890 OTHER SPECIFIED POSTPROCEDURAL STATES: Chronic | ICD-10-CM

## 2023-01-14 DIAGNOSIS — Z11.52 ENCOUNTER FOR SCREENING FOR COVID-19: ICD-10-CM

## 2023-01-14 LAB — SARS-COV-2 RNA SPEC QL NAA+PROBE: SIGNIFICANT CHANGE UP

## 2023-01-14 PROCEDURE — U0003: CPT

## 2023-01-14 PROCEDURE — C9803: CPT

## 2023-01-14 PROCEDURE — U0005: CPT

## 2023-01-16 ENCOUNTER — TRANSCRIPTION ENCOUNTER (OUTPATIENT)
Age: 48
End: 2023-01-16

## 2023-01-17 ENCOUNTER — RESULT REVIEW (OUTPATIENT)
Age: 48
End: 2023-01-17

## 2023-01-17 ENCOUNTER — TRANSCRIPTION ENCOUNTER (OUTPATIENT)
Age: 48
End: 2023-01-17

## 2023-01-17 ENCOUNTER — INPATIENT (INPATIENT)
Facility: HOSPITAL | Age: 48
LOS: 0 days | Discharge: HOME | DRG: 621 | End: 2023-01-18
Attending: SURGERY | Admitting: SURGERY
Payer: MEDICARE

## 2023-01-17 ENCOUNTER — APPOINTMENT (OUTPATIENT)
Dept: SURGERY | Facility: HOSPITAL | Age: 48
End: 2023-01-17
Payer: MEDICARE

## 2023-01-17 VITALS
SYSTOLIC BLOOD PRESSURE: 137 MMHG | HEART RATE: 91 BPM | RESPIRATION RATE: 18 BRPM | WEIGHT: 293 LBS | TEMPERATURE: 98 F | DIASTOLIC BLOOD PRESSURE: 82 MMHG | OXYGEN SATURATION: 98 % | HEIGHT: 64.96 IN

## 2023-01-17 DIAGNOSIS — Z01.818 ENCOUNTER FOR OTHER PREPROCEDURAL EXAMINATION: ICD-10-CM

## 2023-01-17 DIAGNOSIS — Z98.890 OTHER SPECIFIED POSTPROCEDURAL STATES: Chronic | ICD-10-CM

## 2023-01-17 DIAGNOSIS — K80.20 CALCULUS OF GALLBLADDER WITHOUT CHOLECYSTITIS WITHOUT OBSTRUCTION: ICD-10-CM

## 2023-01-17 DIAGNOSIS — E66.01 MORBID (SEVERE) OBESITY DUE TO EXCESS CALORIES: ICD-10-CM

## 2023-01-17 LAB
ANION GAP SERPL CALC-SCNC: 15 MMOL/L — SIGNIFICANT CHANGE UP (ref 5–17)
BASOPHILS # BLD AUTO: 0.05 K/UL — SIGNIFICANT CHANGE UP (ref 0–0.2)
BASOPHILS NFR BLD AUTO: 0.3 % — SIGNIFICANT CHANGE UP (ref 0–2)
BUN SERPL-MCNC: 8 MG/DL — SIGNIFICANT CHANGE UP (ref 7–23)
CALCIUM SERPL-MCNC: 9.1 MG/DL — SIGNIFICANT CHANGE UP (ref 8.4–10.5)
CHLORIDE SERPL-SCNC: 101 MMOL/L — SIGNIFICANT CHANGE UP (ref 96–108)
CO2 SERPL-SCNC: 21 MMOL/L — LOW (ref 22–31)
CREAT SERPL-MCNC: 0.7 MG/DL — SIGNIFICANT CHANGE UP (ref 0.5–1.3)
EGFR: 108 ML/MIN/1.73M2 — SIGNIFICANT CHANGE UP
EOSINOPHIL # BLD AUTO: 0.02 K/UL — SIGNIFICANT CHANGE UP (ref 0–0.5)
EOSINOPHIL NFR BLD AUTO: 0.1 % — SIGNIFICANT CHANGE UP (ref 0–6)
GLUCOSE BLDC GLUCOMTR-MCNC: 112 MG/DL — HIGH (ref 70–99)
GLUCOSE SERPL-MCNC: 166 MG/DL — HIGH (ref 70–99)
HCG UR QL: NEGATIVE — SIGNIFICANT CHANGE UP
HCT VFR BLD CALC: 41.3 % — SIGNIFICANT CHANGE UP (ref 34.5–45)
HGB BLD-MCNC: 12.7 G/DL — SIGNIFICANT CHANGE UP (ref 11.5–15.5)
IMM GRANULOCYTES NFR BLD AUTO: 0.6 % — SIGNIFICANT CHANGE UP (ref 0–0.9)
LYMPHOCYTES # BLD AUTO: 0.83 K/UL — LOW (ref 1–3.3)
LYMPHOCYTES # BLD AUTO: 5.2 % — LOW (ref 13–44)
MAGNESIUM SERPL-MCNC: 2.1 MG/DL — SIGNIFICANT CHANGE UP (ref 1.6–2.6)
MCHC RBC-ENTMCNC: 26.6 PG — LOW (ref 27–34)
MCHC RBC-ENTMCNC: 30.8 GM/DL — LOW (ref 32–36)
MCV RBC AUTO: 86.6 FL — SIGNIFICANT CHANGE UP (ref 80–100)
MONOCYTES # BLD AUTO: 0.25 K/UL — SIGNIFICANT CHANGE UP (ref 0–0.9)
MONOCYTES NFR BLD AUTO: 1.6 % — LOW (ref 2–14)
NEUTROPHILS # BLD AUTO: 14.6 K/UL — HIGH (ref 1.8–7.4)
NEUTROPHILS NFR BLD AUTO: 92.2 % — HIGH (ref 43–77)
NRBC # BLD: 0 /100 WBCS — SIGNIFICANT CHANGE UP (ref 0–0)
PHOSPHATE SERPL-MCNC: 3.1 MG/DL — SIGNIFICANT CHANGE UP (ref 2.5–4.5)
PLATELET # BLD AUTO: 295 K/UL — SIGNIFICANT CHANGE UP (ref 150–400)
POTASSIUM SERPL-MCNC: 4.1 MMOL/L — SIGNIFICANT CHANGE UP (ref 3.5–5.3)
POTASSIUM SERPL-SCNC: 4.1 MMOL/L — SIGNIFICANT CHANGE UP (ref 3.5–5.3)
RBC # BLD: 4.77 M/UL — SIGNIFICANT CHANGE UP (ref 3.8–5.2)
RBC # FLD: 13.3 % — SIGNIFICANT CHANGE UP (ref 10.3–14.5)
SODIUM SERPL-SCNC: 137 MMOL/L — SIGNIFICANT CHANGE UP (ref 135–145)
WBC # BLD: 15.85 K/UL — HIGH (ref 3.8–10.5)
WBC # FLD AUTO: 15.85 K/UL — HIGH (ref 3.8–10.5)

## 2023-01-17 PROCEDURE — 88307 TISSUE EXAM BY PATHOLOGIST: CPT | Mod: 26

## 2023-01-17 PROCEDURE — 88304 TISSUE EXAM BY PATHOLOGIST: CPT | Mod: 26

## 2023-01-17 PROCEDURE — 43775 LAP SLEEVE GASTRECTOMY: CPT | Mod: 82

## 2023-01-17 PROCEDURE — 47562 LAPAROSCOPIC CHOLECYSTECTOMY: CPT | Mod: 82

## 2023-01-17 PROCEDURE — 47562 LAPAROSCOPIC CHOLECYSTECTOMY: CPT

## 2023-01-17 PROCEDURE — 43775 LAP SLEEVE GASTRECTOMY: CPT

## 2023-01-17 DEVICE — STAPLER COVIDIEN TRI-STAPLE 60MM BLACK INTELLIGENT RELOAD: Type: IMPLANTABLE DEVICE | Status: FUNCTIONAL

## 2023-01-17 DEVICE — STAPLER COVIDIEN TRI-STAPLE 60MM PURPLE INTELLIGENT RELOAD: Type: IMPLANTABLE DEVICE | Status: FUNCTIONAL

## 2023-01-17 DEVICE — CLIP APPLIER COVIDIEN ENDOCLIP II 10MM MED/LG: Type: IMPLANTABLE DEVICE | Status: FUNCTIONAL

## 2023-01-17 DEVICE — ARISTA 3GR: Type: IMPLANTABLE DEVICE | Status: FUNCTIONAL

## 2023-01-17 DEVICE — CLIP APPLIER COVIDIEN ENDOCLIP III 5MM: Type: IMPLANTABLE DEVICE | Status: FUNCTIONAL

## 2023-01-17 RX ORDER — CHLORHEXIDINE GLUCONATE 213 G/1000ML
1 SOLUTION TOPICAL ONCE
Refills: 0 | Status: DISCONTINUED | OUTPATIENT
Start: 2023-01-17 | End: 2023-01-17

## 2023-01-17 RX ORDER — ACETAMINOPHEN 500 MG
1000 TABLET ORAL ONCE
Refills: 0 | Status: COMPLETED | OUTPATIENT
Start: 2023-01-18 | End: 2023-01-18

## 2023-01-17 RX ORDER — HEPARIN SODIUM 5000 [USP'U]/ML
5000 INJECTION INTRAVENOUS; SUBCUTANEOUS EVERY 8 HOURS
Refills: 0 | Status: DISCONTINUED | OUTPATIENT
Start: 2023-01-17 | End: 2023-01-18

## 2023-01-17 RX ORDER — HEPARIN SODIUM 5000 [USP'U]/ML
7500 INJECTION INTRAVENOUS; SUBCUTANEOUS ONCE
Refills: 0 | Status: COMPLETED | OUTPATIENT
Start: 2023-01-17 | End: 2023-01-17

## 2023-01-17 RX ORDER — FENTANYL CITRATE 50 UG/ML
25 INJECTION INTRAVENOUS
Refills: 0 | Status: DISCONTINUED | OUTPATIENT
Start: 2023-01-17 | End: 2023-01-17

## 2023-01-17 RX ORDER — PANTOPRAZOLE SODIUM 20 MG/1
40 TABLET, DELAYED RELEASE ORAL EVERY 24 HOURS
Refills: 0 | Status: DISCONTINUED | OUTPATIENT
Start: 2023-01-17 | End: 2023-01-18

## 2023-01-17 RX ORDER — CEFAZOLIN SODIUM 1 G
3000 VIAL (EA) INJECTION EVERY 8 HOURS
Refills: 0 | Status: COMPLETED | OUTPATIENT
Start: 2023-01-17 | End: 2023-01-18

## 2023-01-17 RX ORDER — SODIUM CHLORIDE 9 MG/ML
1000 INJECTION, SOLUTION INTRAVENOUS
Refills: 0 | Status: DISCONTINUED | OUTPATIENT
Start: 2023-01-17 | End: 2023-01-18

## 2023-01-17 RX ORDER — ACETAMINOPHEN 500 MG
1000 TABLET ORAL ONCE
Refills: 0 | Status: COMPLETED | OUTPATIENT
Start: 2023-01-17 | End: 2023-01-17

## 2023-01-17 RX ORDER — LIDOCAINE HCL 20 MG/ML
0.2 VIAL (ML) INJECTION ONCE
Refills: 0 | Status: DISCONTINUED | OUTPATIENT
Start: 2023-01-17 | End: 2023-01-17

## 2023-01-17 RX ORDER — CEFAZOLIN SODIUM 1 G
3000 VIAL (EA) INJECTION ONCE
Refills: 0 | Status: DISCONTINUED | OUTPATIENT
Start: 2023-01-17 | End: 2023-01-17

## 2023-01-17 RX ORDER — FOLIC ACID 0.8 MG
1 TABLET ORAL ONCE
Refills: 0 | Status: COMPLETED | OUTPATIENT
Start: 2023-01-17 | End: 2023-01-17

## 2023-01-17 RX ORDER — FENTANYL CITRATE 50 UG/ML
50 INJECTION INTRAVENOUS ONCE
Refills: 0 | Status: DISCONTINUED | OUTPATIENT
Start: 2023-01-17 | End: 2023-01-17

## 2023-01-17 RX ORDER — THIAMINE MONONITRATE (VIT B1) 100 MG
100 TABLET ORAL ONCE
Refills: 0 | Status: COMPLETED | OUTPATIENT
Start: 2023-01-17 | End: 2023-01-17

## 2023-01-17 RX ORDER — FOSAPREPITANT DIMEGLUMINE 150 MG/5ML
150 INJECTION, POWDER, LYOPHILIZED, FOR SOLUTION INTRAVENOUS ONCE
Refills: 0 | Status: COMPLETED | OUTPATIENT
Start: 2023-01-17 | End: 2023-01-17

## 2023-01-17 RX ORDER — HYOSCYAMINE SULFATE 0.13 MG
0.12 TABLET ORAL EVERY 6 HOURS
Refills: 0 | Status: DISCONTINUED | OUTPATIENT
Start: 2023-01-17 | End: 2023-01-18

## 2023-01-17 RX ORDER — SODIUM CHLORIDE 9 MG/ML
3 INJECTION INTRAMUSCULAR; INTRAVENOUS; SUBCUTANEOUS EVERY 8 HOURS
Refills: 0 | Status: DISCONTINUED | OUTPATIENT
Start: 2023-01-17 | End: 2023-01-17

## 2023-01-17 RX ORDER — INFLUENZA VIRUS VACCINE 15; 15; 15; 15 UG/.5ML; UG/.5ML; UG/.5ML; UG/.5ML
0.5 SUSPENSION INTRAMUSCULAR ONCE
Refills: 0 | Status: DISCONTINUED | OUTPATIENT
Start: 2023-01-17 | End: 2023-01-18

## 2023-01-17 RX ORDER — HEPARIN SODIUM 5000 [USP'U]/ML
7500 INJECTION INTRAVENOUS; SUBCUTANEOUS ONCE
Refills: 0 | Status: DISCONTINUED | OUTPATIENT
Start: 2023-01-17 | End: 2023-01-17

## 2023-01-17 RX ORDER — FOSAPREPITANT DIMEGLUMINE 150 MG/5ML
150 INJECTION, POWDER, LYOPHILIZED, FOR SOLUTION INTRAVENOUS ONCE
Refills: 0 | Status: DISCONTINUED | OUTPATIENT
Start: 2023-01-17 | End: 2023-01-17

## 2023-01-17 RX ORDER — KETOROLAC TROMETHAMINE 30 MG/ML
30 SYRINGE (ML) INJECTION EVERY 6 HOURS
Refills: 0 | Status: DISCONTINUED | OUTPATIENT
Start: 2023-01-17 | End: 2023-01-18

## 2023-01-17 RX ORDER — ONDANSETRON 8 MG/1
4 TABLET, FILM COATED ORAL ONCE
Refills: 0 | Status: DISCONTINUED | OUTPATIENT
Start: 2023-01-17 | End: 2023-01-17

## 2023-01-17 RX ORDER — ONDANSETRON 8 MG/1
4 TABLET, FILM COATED ORAL EVERY 6 HOURS
Refills: 0 | Status: DISCONTINUED | OUTPATIENT
Start: 2023-01-17 | End: 2023-01-18

## 2023-01-17 RX ORDER — CEFAZOLIN SODIUM 1 G
3000 VIAL (EA) INJECTION ONCE
Refills: 0 | Status: COMPLETED | OUTPATIENT
Start: 2023-01-17 | End: 2023-01-17

## 2023-01-17 RX ADMIN — SODIUM CHLORIDE 150 MILLILITER(S): 9 INJECTION, SOLUTION INTRAVENOUS at 12:29

## 2023-01-17 RX ADMIN — FENTANYL CITRATE 25 MICROGRAM(S): 50 INJECTION INTRAVENOUS at 13:20

## 2023-01-17 RX ADMIN — FENTANYL CITRATE 25 MICROGRAM(S): 50 INJECTION INTRAVENOUS at 13:42

## 2023-01-17 RX ADMIN — Medication 1000 MILLIGRAM(S): at 21:50

## 2023-01-17 RX ADMIN — ONDANSETRON 4 MILLIGRAM(S): 8 TABLET, FILM COATED ORAL at 17:43

## 2023-01-17 RX ADMIN — Medication 100 MILLIGRAM(S): at 12:31

## 2023-01-17 RX ADMIN — Medication 0.12 MILLIGRAM(S): at 17:43

## 2023-01-17 RX ADMIN — HEPARIN SODIUM 7500 UNIT(S): 5000 INJECTION INTRAVENOUS; SUBCUTANEOUS at 07:58

## 2023-01-17 RX ADMIN — Medication 1000 MILLIGRAM(S): at 16:15

## 2023-01-17 RX ADMIN — Medication 0.12 MILLIGRAM(S): at 12:31

## 2023-01-17 RX ADMIN — Medication 1 MILLIGRAM(S): at 12:37

## 2023-01-17 RX ADMIN — SODIUM CHLORIDE 250 MILLILITER(S): 9 INJECTION, SOLUTION INTRAVENOUS at 12:39

## 2023-01-17 RX ADMIN — ONDANSETRON 4 MILLIGRAM(S): 8 TABLET, FILM COATED ORAL at 12:31

## 2023-01-17 RX ADMIN — Medication 200 MILLIGRAM(S): at 18:12

## 2023-01-17 RX ADMIN — FOSAPREPITANT DIMEGLUMINE 300 MILLIGRAM(S): 150 INJECTION, POWDER, LYOPHILIZED, FOR SOLUTION INTRAVENOUS at 07:58

## 2023-01-17 RX ADMIN — FENTANYL CITRATE 25 MICROGRAM(S): 50 INJECTION INTRAVENOUS at 12:52

## 2023-01-17 RX ADMIN — FENTANYL CITRATE 25 MICROGRAM(S): 50 INJECTION INTRAVENOUS at 14:12

## 2023-01-17 RX ADMIN — Medication 400 MILLIGRAM(S): at 15:35

## 2023-01-17 RX ADMIN — FENTANYL CITRATE 25 MICROGRAM(S): 50 INJECTION INTRAVENOUS at 12:29

## 2023-01-17 RX ADMIN — FENTANYL CITRATE 50 MICROGRAM(S): 50 INJECTION INTRAVENOUS at 12:10

## 2023-01-17 RX ADMIN — SODIUM CHLORIDE 150 MILLILITER(S): 9 INJECTION, SOLUTION INTRAVENOUS at 17:49

## 2023-01-17 RX ADMIN — HEPARIN SODIUM 5000 UNIT(S): 5000 INJECTION INTRAVENOUS; SUBCUTANEOUS at 22:34

## 2023-01-17 RX ADMIN — FENTANYL CITRATE 50 MICROGRAM(S): 50 INJECTION INTRAVENOUS at 11:40

## 2023-01-17 RX ADMIN — Medication 400 MILLIGRAM(S): at 21:22

## 2023-01-17 RX ADMIN — PANTOPRAZOLE SODIUM 40 MILLIGRAM(S): 20 TABLET, DELAYED RELEASE ORAL at 12:31

## 2023-01-17 RX ADMIN — Medication 30 MILLIGRAM(S): at 19:00

## 2023-01-17 RX ADMIN — Medication 30 MILLIGRAM(S): at 18:12

## 2023-01-17 RX ADMIN — HEPARIN SODIUM 5000 UNIT(S): 5000 INJECTION INTRAVENOUS; SUBCUTANEOUS at 14:36

## 2023-01-17 NOTE — PATIENT PROFILE ADULT - FUNCTIONAL ASSESSMENT - BASIC MOBILITY 6.
Addended by: ROSALBA GALLARDO on: 9/12/2022 10:36 AM     Modules accepted: Orders    
4 = No assist / stand by assistance

## 2023-01-17 NOTE — BRIEF OPERATIVE NOTE - OPERATION/FINDINGS
no hiatal hernia seen, sleeve gastrectomy with negative leak test.  Cholecystectomy with critical view

## 2023-01-17 NOTE — PRE-ANESTHESIA EVALUATION ADULT - NSANTHPMHFT_GEN_ALL_CORE
47 yo female, PMH morbid obesity, moderate NICOLÁS - no CPAP, ENGLISH on exertion due to weight, has tried many diet modalities without success, presents to PST for scheduled Laparoscopic Sleeve Gastrectomy, Cholecystectomy 11/15/22. Pt.had COVID-19 infection on 12/26/21 with symptoms of fever, body aches and runny nose. Denies recent fever, chills, chest pain, SOB, changes in bowel/urinary habits.

## 2023-01-17 NOTE — BRIEF OPERATIVE NOTE - NSICDXBRIEFPREOP_GEN_ALL_CORE_FT
PRE-OP DIAGNOSIS:  Obesity, morbid, BMI 50 or higher 17-Jan-2023 13:04:42  Laura Rayo  Gallstones 17-Jan-2023 13:04:54  Laura Rayo

## 2023-01-17 NOTE — BRIEF OPERATIVE NOTE - NSICDXBRIEFPOSTOP_GEN_ALL_CORE_FT
POST-OP DIAGNOSIS:  Obesity, morbid, BMI 50 or higher 17-Jan-2023 13:05:20  Laura Rayo  Gallstones 17-Jan-2023 13:05:37  Laura Rayo

## 2023-01-17 NOTE — BRIEF OPERATIVE NOTE - NSICDXBRIEFPROCEDURE_GEN_ALL_CORE_FT
PROCEDURES:  Laparoscopic sleeve gastrectomy 17-Jan-2023 13:04:01  Laura Rayo  Laparoscopic cholecystectomy 17-Jan-2023 13:04:20  Laura Rayo

## 2023-01-17 NOTE — CHART NOTE - NSCHARTNOTEFT_GEN_A_CORE
Post Operative Check    Patient is post op from a Laparoscopic sleeve gastrectomy and laparoscopic cholecystectomy, recovering appropriately. Patient reports generalized abdominal pain, denies nausea, vomiting, chest pain, SOB. Tolerating ice chips. Has not ambulated or urinated independently yet, willing to try.    Vitals    T(C): 36.3 (01-17-23 @ 15:26), Max: 36.6 (01-17-23 @ 07:14)  HR: 90 (01-17-23 @ 15:26) (86 - 93)  BP: 137/79 (01-17-23 @ 15:26) (137/79 - 159/88)  RR: 18 (01-17-23 @ 15:26) (12 - 18)  SpO2: 95% (01-17-23 @ 15:26) (92% - 98%)      01-17 @ 07:01  -  01-17 @ 15:32  --------------------------------------------------------  IN:    Lactated Ringers: 150 mL    sodium chloride 0.9% w/ Additives: 500 mL  Total IN: 650 mL    OUT:  Total OUT: 0 mL    Total NET: 650 mL          Labs                        12.7   15.85 )-----------( 295      ( 17 Jan 2023 12:43 )             41.3       CBC Full  -  ( 17 Jan 2023 12:43 )  WBC Count : 15.85 K/uL  Hemoglobin : 12.7 g/dL  Hematocrit : 41.3 %  Platelet Count - Automated : 295 K/uL  Mean Cell Volume : 86.6 fl  Mean Cell Hemoglobin : 26.6 pg  Mean Cell Hemoglobin Concentration : 30.8 gm/dL  Auto Neutrophil # : 14.60 K/uL  Auto Lymphocyte # : 0.83 K/uL  Auto Monocyte # : 0.25 K/uL  Auto Eosinophil # : 0.02 K/uL  Auto Basophil # : 0.05 K/uL  Auto Neutrophil % : 92.2 %  Auto Lymphocyte % : 5.2 %  Auto Monocyte % : 1.6 %  Auto Eosinophil % : 0.1 %  Auto Basophil % : 0.3 %      Physical Exam  General: NAD, resting in bed  Abdomen: soft, mildly distended, appropriately tender, port site dressings c/d/i      Patient is a 47y old Female w/ PMH morbid obesity, moderate NICOLÁS - no CPAP, now s/p lap sleeve gastrectomy and lap darshan. Recovering appropriately.    Plan:  - bariatric postop protocol  - pain control  - sips and chips  - OOB/ambulate w/ assistance  - DVT ppx  - AM labs  - plan for dc tomorrow      [facility]  [id number] Post Operative Check    Patient is post op from a Laparoscopic sleeve gastrectomy and laparoscopic cholecystectomy, recovering appropriately. Patient reports generalized abdominal pain, denies nausea, vomiting, chest pain, SOB. Tolerating ice chips. Has not ambulated or urinated independently yet, willing to try.    Vitals    T(C): 36.3 (01-17-23 @ 15:26), Max: 36.6 (01-17-23 @ 07:14)  HR: 90 (01-17-23 @ 15:26) (86 - 93)  BP: 137/79 (01-17-23 @ 15:26) (137/79 - 159/88)  RR: 18 (01-17-23 @ 15:26) (12 - 18)  SpO2: 95% (01-17-23 @ 15:26) (92% - 98%)      01-17 @ 07:01  -  01-17 @ 15:32  --------------------------------------------------------  IN:    Lactated Ringers: 150 mL    sodium chloride 0.9% w/ Additives: 500 mL  Total IN: 650 mL    OUT:  Total OUT: 0 mL    Total NET: 650 mL          Labs                        12.7   15.85 )-----------( 295      ( 17 Jan 2023 12:43 )             41.3       CBC Full  -  ( 17 Jan 2023 12:43 )  WBC Count : 15.85 K/uL  Hemoglobin : 12.7 g/dL  Hematocrit : 41.3 %  Platelet Count - Automated : 295 K/uL  Mean Cell Volume : 86.6 fl  Mean Cell Hemoglobin : 26.6 pg  Mean Cell Hemoglobin Concentration : 30.8 gm/dL  Auto Neutrophil # : 14.60 K/uL  Auto Lymphocyte # : 0.83 K/uL  Auto Monocyte # : 0.25 K/uL  Auto Eosinophil # : 0.02 K/uL  Auto Basophil # : 0.05 K/uL  Auto Neutrophil % : 92.2 %  Auto Lymphocyte % : 5.2 %  Auto Monocyte % : 1.6 %  Auto Eosinophil % : 0.1 %  Auto Basophil % : 0.3 %      Physical Exam  General: NAD, resting in bed  Abdomen: soft, mildly distended, appropriately tender, port site dressings c/d/i      Patient is a 47y old Female w/ PMH morbid obesity, moderate NICOLÁS - no CPAP, now s/p lap sleeve gastrectomy and lap darshan. Recovering appropriately.    Plan:  - bariatric postop protocol  - pain control  - sips and chips, advance to [facility] clears at 6hrs postop  - OOB/ambulate w/ assistance  - DVT ppx  - AM labs  - plan for dc tomorrow      [facility] Surgery  [id number]

## 2023-01-17 NOTE — PRE-ANESTHESIA EVALUATION ADULT - NSANTHRISKNONERD_GEN_ALL_CORE
Spoke with patient informed patient per Dr Mullen office note in 5/2016, patient had abdominal adhesions due to a prior GYN surgery and pain was not gastroenterology related and could follow up with PCP.  Please advise.   Risk Alerts:

## 2023-01-17 NOTE — PATIENT PROFILE ADULT - FALL HARM RISK - HARM RISK INTERVENTIONS

## 2023-01-18 ENCOUNTER — TRANSCRIPTION ENCOUNTER (OUTPATIENT)
Age: 48
End: 2023-01-18

## 2023-01-18 VITALS
HEART RATE: 80 BPM | SYSTOLIC BLOOD PRESSURE: 145 MMHG | TEMPERATURE: 98 F | DIASTOLIC BLOOD PRESSURE: 76 MMHG | OXYGEN SATURATION: 95 % | RESPIRATION RATE: 18 BRPM

## 2023-01-18 LAB
ANION GAP SERPL CALC-SCNC: 12 MMOL/L — SIGNIFICANT CHANGE UP (ref 5–17)
BASOPHILS # BLD AUTO: 0.03 K/UL — SIGNIFICANT CHANGE UP (ref 0–0.2)
BASOPHILS NFR BLD AUTO: 0.2 % — SIGNIFICANT CHANGE UP (ref 0–2)
BUN SERPL-MCNC: 7 MG/DL — SIGNIFICANT CHANGE UP (ref 7–23)
CALCIUM SERPL-MCNC: 8.9 MG/DL — SIGNIFICANT CHANGE UP (ref 8.4–10.5)
CHLORIDE SERPL-SCNC: 103 MMOL/L — SIGNIFICANT CHANGE UP (ref 96–108)
CO2 SERPL-SCNC: 23 MMOL/L — SIGNIFICANT CHANGE UP (ref 22–31)
CREAT SERPL-MCNC: 0.7 MG/DL — SIGNIFICANT CHANGE UP (ref 0.5–1.3)
EGFR: 108 ML/MIN/1.73M2 — SIGNIFICANT CHANGE UP
EOSINOPHIL # BLD AUTO: 0.01 K/UL — SIGNIFICANT CHANGE UP (ref 0–0.5)
EOSINOPHIL NFR BLD AUTO: 0.1 % — SIGNIFICANT CHANGE UP (ref 0–6)
GLUCOSE SERPL-MCNC: 106 MG/DL — HIGH (ref 70–99)
HCT VFR BLD CALC: 38 % — SIGNIFICANT CHANGE UP (ref 34.5–45)
HGB BLD-MCNC: 11.8 G/DL — SIGNIFICANT CHANGE UP (ref 11.5–15.5)
IMM GRANULOCYTES NFR BLD AUTO: 0.5 % — SIGNIFICANT CHANGE UP (ref 0–0.9)
LYMPHOCYTES # BLD AUTO: 1.64 K/UL — SIGNIFICANT CHANGE UP (ref 1–3.3)
LYMPHOCYTES # BLD AUTO: 11.3 % — LOW (ref 13–44)
MCHC RBC-ENTMCNC: 27 PG — SIGNIFICANT CHANGE UP (ref 27–34)
MCHC RBC-ENTMCNC: 31.1 GM/DL — LOW (ref 32–36)
MCV RBC AUTO: 87 FL — SIGNIFICANT CHANGE UP (ref 80–100)
MONOCYTES # BLD AUTO: 1.05 K/UL — HIGH (ref 0–0.9)
MONOCYTES NFR BLD AUTO: 7.2 % — SIGNIFICANT CHANGE UP (ref 2–14)
NEUTROPHILS # BLD AUTO: 11.76 K/UL — HIGH (ref 1.8–7.4)
NEUTROPHILS NFR BLD AUTO: 80.7 % — HIGH (ref 43–77)
NRBC # BLD: 0 /100 WBCS — SIGNIFICANT CHANGE UP (ref 0–0)
PLATELET # BLD AUTO: 286 K/UL — SIGNIFICANT CHANGE UP (ref 150–400)
POTASSIUM SERPL-MCNC: 4.6 MMOL/L — SIGNIFICANT CHANGE UP (ref 3.5–5.3)
POTASSIUM SERPL-SCNC: 4.6 MMOL/L — SIGNIFICANT CHANGE UP (ref 3.5–5.3)
RBC # BLD: 4.37 M/UL — SIGNIFICANT CHANGE UP (ref 3.8–5.2)
RBC # FLD: 13.3 % — SIGNIFICANT CHANGE UP (ref 10.3–14.5)
SODIUM SERPL-SCNC: 138 MMOL/L — SIGNIFICANT CHANGE UP (ref 135–145)
WBC # BLD: 14.57 K/UL — HIGH (ref 3.8–10.5)
WBC # FLD AUTO: 14.57 K/UL — HIGH (ref 3.8–10.5)

## 2023-01-18 PROCEDURE — 94660 CPAP INITIATION&MGMT: CPT

## 2023-01-18 PROCEDURE — 84100 ASSAY OF PHOSPHORUS: CPT

## 2023-01-18 PROCEDURE — 81025 URINE PREGNANCY TEST: CPT

## 2023-01-18 PROCEDURE — 36415 COLL VENOUS BLD VENIPUNCTURE: CPT

## 2023-01-18 PROCEDURE — 88307 TISSUE EXAM BY PATHOLOGIST: CPT

## 2023-01-18 PROCEDURE — C1889: CPT

## 2023-01-18 PROCEDURE — 88304 TISSUE EXAM BY PATHOLOGIST: CPT

## 2023-01-18 PROCEDURE — 85025 COMPLETE CBC W/AUTO DIFF WBC: CPT

## 2023-01-18 PROCEDURE — 82962 GLUCOSE BLOOD TEST: CPT

## 2023-01-18 PROCEDURE — C9399: CPT

## 2023-01-18 PROCEDURE — 83735 ASSAY OF MAGNESIUM: CPT

## 2023-01-18 PROCEDURE — 80048 BASIC METABOLIC PNL TOTAL CA: CPT

## 2023-01-18 RX ORDER — ONDANSETRON 8 MG/1
1 TABLET, FILM COATED ORAL
Qty: 28 | Refills: 0
Start: 2023-01-18 | End: 2023-01-24

## 2023-01-18 RX ORDER — SIMETHICONE 80 MG/1
80 TABLET, CHEWABLE ORAL THREE TIMES A DAY
Refills: 0 | Status: DISCONTINUED | OUTPATIENT
Start: 2023-01-18 | End: 2023-01-18

## 2023-01-18 RX ORDER — HYOSCYAMINE SULFATE 0.13 MG
1 TABLET ORAL
Qty: 28 | Refills: 0
Start: 2023-01-18 | End: 2023-01-24

## 2023-01-18 RX ORDER — ENOXAPARIN SODIUM 100 MG/ML
40 INJECTION SUBCUTANEOUS
Qty: 5.6 | Refills: 0
Start: 2023-01-18 | End: 2023-01-31

## 2023-01-18 RX ORDER — OMEPRAZOLE 10 MG/1
1 CAPSULE, DELAYED RELEASE ORAL
Qty: 30 | Refills: 0
Start: 2023-01-18 | End: 2023-02-16

## 2023-01-18 RX ORDER — ACETAMINOPHEN 500 MG
15 TABLET ORAL
Qty: 300 | Refills: 0
Start: 2023-01-18 | End: 2023-01-22

## 2023-01-18 RX ORDER — SIMETHICONE 80 MG/1
1 TABLET, CHEWABLE ORAL
Qty: 0 | Refills: 0 | DISCHARGE
Start: 2023-01-18

## 2023-01-18 RX ADMIN — Medication 0.12 MILLIGRAM(S): at 11:06

## 2023-01-18 RX ADMIN — ONDANSETRON 4 MILLIGRAM(S): 8 TABLET, FILM COATED ORAL at 00:32

## 2023-01-18 RX ADMIN — HEPARIN SODIUM 5000 UNIT(S): 5000 INJECTION INTRAVENOUS; SUBCUTANEOUS at 05:41

## 2023-01-18 RX ADMIN — Medication 30 MILLIGRAM(S): at 01:30

## 2023-01-18 RX ADMIN — Medication 30 MILLIGRAM(S): at 05:40

## 2023-01-18 RX ADMIN — SIMETHICONE 80 MILLIGRAM(S): 80 TABLET, CHEWABLE ORAL at 11:41

## 2023-01-18 RX ADMIN — SODIUM CHLORIDE 150 MILLILITER(S): 9 INJECTION, SOLUTION INTRAVENOUS at 11:06

## 2023-01-18 RX ADMIN — ONDANSETRON 4 MILLIGRAM(S): 8 TABLET, FILM COATED ORAL at 11:05

## 2023-01-18 RX ADMIN — PANTOPRAZOLE SODIUM 40 MILLIGRAM(S): 20 TABLET, DELAYED RELEASE ORAL at 11:05

## 2023-01-18 RX ADMIN — Medication 0.12 MILLIGRAM(S): at 05:32

## 2023-01-18 RX ADMIN — Medication 200 MILLIGRAM(S): at 01:55

## 2023-01-18 RX ADMIN — Medication 30 MILLIGRAM(S): at 00:33

## 2023-01-18 RX ADMIN — Medication 400 MILLIGRAM(S): at 03:23

## 2023-01-18 RX ADMIN — ONDANSETRON 4 MILLIGRAM(S): 8 TABLET, FILM COATED ORAL at 05:40

## 2023-01-18 RX ADMIN — Medication 30 MILLIGRAM(S): at 06:30

## 2023-01-18 RX ADMIN — Medication 0.12 MILLIGRAM(S): at 00:32

## 2023-01-18 NOTE — PROGRESS NOTE ADULT - ATTENDING COMMENTS
POD1 sleeve/darshan  C/o some gas pain, tolerating clears, ambulating    Vitals normal  Abdomen soft  Inc c/d/i    Cont bariatric protocol  Encouraged ambulation  Discharge when criteria met    Prem Love MD

## 2023-01-18 NOTE — DISCHARGE NOTE PROVIDER - NSDCCPCAREPLAN_GEN_ALL_CORE_FT
PRINCIPAL DISCHARGE DIAGNOSIS  Diagnosis: Morbid obesity  Assessment and Plan of Treatment: WOUND CARE: Please remove any outer dressings in 24 hours.  BATHING: Please do not submerge wound underwater. No swimming pools, no hot tubs, no tub baths. You may shower and/or sponge bathe in 24 hours. Let soapy water run over incisions. DO NOT scrub or soak incision sites. Pat dry.   ACTIVITY: No heavy lifting more than 10-15lbs or straining. Otherwise, you may return to your usual level of physical activity. You may complete your daily activities. Take frequent walks.  NOTIFY YOUR SURGEON IF: You have any bleeding that does not stop, any pus draining from your wound, any fever (over 100.4 F) or chills, persistent nausea/vomiting with inability to tolerate food or liquids, persistent diarrhea, or if your pain is not controlled on your discharge pain medications.  FOLLOW-UP:  1. Follow up with your surgeon as per preoperative instructions.  2. Please follow up with your primary care physician in one week regarding your hospitalization.      SECONDARY DISCHARGE DIAGNOSES  Diagnosis: Need for prophylactic measure  Assessment and Plan of Treatment: You are prescribed Lovenox to prevent DVT (a blood clot). Please administer daily for 2 weeks as per RN education.

## 2023-01-18 NOTE — DISCHARGE NOTE PROVIDER - NSDCCPTREATMENT_GEN_ALL_CORE_FT
PRINCIPAL PROCEDURE  Procedure: Laparoscopic sleeve gastrectomy  Findings and Treatment:       SECONDARY PROCEDURE  Procedure: Laparoscopic sleeve gastrectomy  Findings and Treatment:      PRINCIPAL PROCEDURE  Procedure: Laparoscopic sleeve gastrectomy  Findings and Treatment: analgesia, bariatric diet, increase physical activity, dietary supplement, VTE prophylaxis, follow up care      SECONDARY PROCEDURE  Procedure: Laparoscopic sleeve gastrectomy  Findings and Treatment:

## 2023-01-18 NOTE — DIETITIAN INITIAL EVALUATION ADULT - PERTINENT MEDS FT
MEDICATIONS  (STANDING):  heparin   Injectable 5000 Unit(s) SubCutaneous every 8 hours  hyoscyamine SL 0.125 milliGRAM(s) SubLingual every 6 hours  influenza   Vaccine 0.5 milliLiter(s) IntraMuscular once  lactated ringers. 1000 milliLiter(s) (150 mL/Hr) IV Continuous <Continuous>  ondansetron Injectable 4 milliGRAM(s) IV Push every 6 hours  pantoprazole  Injectable 40 milliGRAM(s) IV Push every 24 hours    MEDICATIONS  (PRN):

## 2023-01-18 NOTE — PROGRESS NOTE ADULT - SUBJECTIVE AND OBJECTIVE BOX
Post Op Day#: 1    Subjective: "comfortable, no pain, no N/V    Objective: No acute events overnight, effective pain control, denies N/V, little gas discomfort.  Continuous pulse oximetry at bedside functioning,  v/s stable, afebrile. Labs within acceptable range, chronic elevated WBC as per patient  Ambulated independently around the unit.  Tolerating bariatric clear liquid diet and voiding as expected.                                               Vital Signs Last 24 Hrs  T(C): 36.9 (18 Jan 2023 09:20), Max: 36.9 (17 Jan 2023 18:36)  T(F): 98.4 (18 Jan 2023 09:20), Max: 98.5 (17 Jan 2023 20:51)  HR: 79 (18 Jan 2023 09:24) (73 - 100)  BP: 106/52 (18 Jan 2023 09:20) (106/52 - 158/77)  BP(mean): 110 (17 Jan 2023 14:45) (98 - 110)  RR: 18 (18 Jan 2023 09:24) (12 - 18)  SpO2: 98% (18 Jan 2023 09:24) (92% - 99%)    Parameters below as of 18 Jan 2023 09:24  Patient On (Oxygen Delivery Method): room air                                                   I&O's Summary    17 Jan 2023 07:01  -  18 Jan 2023 07:00  --------------------------------------------------------  IN: 770 mL / OUT: 1300 mL / NET: -530 mL    18 Jan 2023 07:01  -  18 Jan 2023 13:26  --------------------------------------------------------  IN: 0 mL / OUT: 350 mL / NET: -350 mL                                                                          11.8   14.57 )-----------( 286      ( 18 Jan 2023 08:33 )             38.0                                                 01-18    138  |  103  |  7   ----------------------------<  106<H>  4.6   |  23  |  0.70    Ca    8.9      18 Jan 2023 08:33  Phos  3.1     01-17  Mg     2.1     01-17      heparin   Injectable 5000 Unit(s) SubCutaneous every 8 hours  hyoscyamine SL 0.125 milliGRAM(s) SubLingual every 6 hours  influenza   Vaccine 0.5 milliLiter(s) IntraMuscular once  lactated ringers. 1000 milliLiter(s) IV Continuous <Continuous>  ondansetron Injectable 4 milliGRAM(s) IV Push every 6 hours  pantoprazole  Injectable 40 milliGRAM(s) IV Push every 24 hours  simethicone 80 milliGRAM(s) Chew three times a day      Physical Exam:         Lungs:  clear breath sounds b/l, non-productive cough       Heart:  Regular rate & rhythm       Abdomen: obese,  Soft, non-distended.  Scopes sites clean, dry and intact. + bs, - flatus, no rebound or guarding       Skin:  intact, pannus w/o rash       Extremities: + pulses, no edema, no calf tenderness, negative john's     VTE Extended Risk Assessment Scores             [facility]  VTE Predicted RISK Low:   (<1% ),      Assessment and Plan:  47 year old obese female BMI 62.1 with hx of asthma and gallstones, On 1/17/2023 she underwent Lap Sleeve Gastrectomy, Cholecystectomy POD # 1 stable     - Bariatric Clear diet today then protocol derived staged meal progression supervised by RD in outpatient setting  - DVT - LMWH x 14 days(patient education with teach back). GI prophylaxis, Incentive spirometry  - Ambulate as tolerated  - Procedure specific education including postop complications, medications and side effects, diet, vitamins and VTE prevention. Written materials given  - Medication reviewed and reconciled, Bariatric meds obtained from [facility] prior to d/c  - D/C home today once Bariatric 8-Point d/c criteria met  - Follow up with Dr Love in 7-10 days, Dietitian and PMD in 30 days.      Lorrie Gale, JAN, ANP  [phone]

## 2023-01-18 NOTE — PROGRESS NOTE ADULT - SUBJECTIVE AND OBJECTIVE BOX
Surgery Progress Note  Patient is a 47y old  Female who presents with a chief complaint of "sleeve gastrectomy" (01 Nov 2022 09:59)      Overnight Events: No acute events overnight.  SUBJECTIVE: Patient seen and examined at bedside with surgical team, patient without complaints. Denies fever, chills, CP, SOB nausea, vomiting, dizziness.    START [id number]    137  |  101  |  8   ----------------------------<  166<H>  4.1   |  21<L>  |  0.70    Ca    9.1      17 Jan 2023 12:43  Phos  3.1     01-17  Mg     2.1     01-17    POCT Blood Glucose.: 112 mg/dL (01-17-23 @ 07:11)  A1C with Estimated Average Glucose Result: 6.4 % (01-10-23 @ 10:02)  A1C with Estimated Average Glucose Result: 6.7 % (11-01-22 @ 10:01)  END CHEMFISH  START MEDSACTIVEMEDICATIONS  (STANDING):  ceFAZolin   IVPB 3000 milliGRAM(s) IV Intermittent every 8 hours  heparin   Injectable 5000 Unit(s) SubCutaneous every 8 hours  hyoscyamine SL 0.125 milliGRAM(s) SubLingual every 6 hours  influenza   Vaccine 0.5 milliLiter(s) IntraMuscular once  ketorolac   Injectable 30 milliGRAM(s) IV Push every 6 hours  lactated ringers. 1000 milliLiter(s) (150 mL/Hr) IV Continuous <Continuous>  ondansetron Injectable 4 milliGRAM(s) IV Push every 6 hours  pantoprazole  Injectable 40 milliGRAM(s) IV Push every 24 hours  sodium chloride 0.9% 1000 milliLiter(s) (250 mL/Hr) IV Continuous <Continuous>    MEDICATIONS  (PRN):  END MEDSACTIVE  START DIETORDERDiet, Clear Liquid:   Bariatric Clear Liquid (BARICLLIQ) (01-17-23 @ 17:13)  END DIETORDER  START ADMITDXMorbid or severe obesity due to excess calories    Body mass index [BMI] 50.0-59.9, adult    END ADMITDX  START IOFS  END IOFS  START SKINPUEND SKINPU    OBJECTIVE:    Vital Signs Last 24 Hrs  T(C): 36.9 (17 Jan 2023 20:51), Max: 36.9 (17 Jan 2023 18:36)  T(F): 98.5 (17 Jan 2023 20:51), Max: 98.5 (17 Jan 2023 20:51)  HR: 78 (17 Jan 2023 23:17) (78 - 100)  BP: 144/86 (17 Jan 2023 20:51) (135/80 - 159/88)  BP(mean): 110 (17 Jan 2023 14:45) (97 - 114)  RR: 18 (17 Jan 2023 20:51) (12 - 18)  SpO2: 92% (17 Jan 2023 23:17) (92% - 98%)    Parameters below as of 17 Jan 2023 20:51  Patient On (Oxygen Delivery Method): room air    I&O's Detail    17 Jan 2023 07:01  -  18 Jan 2023 00:04  --------------------------------------------------------  IN:    Lactated Ringers: 150 mL    Oral Fluid: 120 mL    sodium chloride 0.9% w/ Additives: 500 mL  Total IN: 770 mL    OUT:    Voided (mL): 400 mL  Total OUT: 400 mL    Total NET: 370 mL      MEDICATIONS  (STANDING):  ceFAZolin   IVPB 3000 milliGRAM(s) IV Intermittent every 8 hours  heparin   Injectable 5000 Unit(s) SubCutaneous every 8 hours  hyoscyamine SL 0.125 milliGRAM(s) SubLingual every 6 hours  influenza   Vaccine 0.5 milliLiter(s) IntraMuscular once  ketorolac   Injectable 30 milliGRAM(s) IV Push every 6 hours  lactated ringers. 1000 milliLiter(s) (150 mL/Hr) IV Continuous <Continuous>  ondansetron Injectable 4 milliGRAM(s) IV Push every 6 hours  pantoprazole  Injectable 40 milliGRAM(s) IV Push every 24 hours  sodium chloride 0.9% 1000 milliLiter(s) (250 mL/Hr) IV Continuous <Continuous>    MEDICATIONS  (PRN):      PHYSICAL EXAM:  Constitutional: A&Ox3, NAD  Respiratory: Unlabored breathing  Abdomen: Soft, nondistended, NTTP. No rebound or guarding.  Extremities: WWP, BRYANT spontaneously    LABS:                        12.7   15.85 )-----------( 295      ( 17 Jan 2023 12:43 )             41.3     01-17    137  |  101  |  8   ----------------------------<  166<H>  4.1   |  21<L>  |  0.70    Ca    9.1      17 Jan 2023 12:43  Phos  3.1     01-17  Mg     2.1     01-17                IMAGING:     Surgery Progress Note  Patient is a 47y old  Female who presents with a chief complaint of "sleeve gastrectomy" (01 Nov 2022 09:59)      Overnight Events: No acute events overnight.  SUBJECTIVE: Patient seen and examined at bedside with surgical team, patient without complaints. Denies fever, chills, CP, SOB nausea, vomiting, dizziness.    START [id number]    137  |  101  |  8   ----------------------------<  166<H>  4.1   |  21<L>  |  0.70    Ca    9.1      17 Jan 2023 12:43  Phos  3.1     01-17  Mg     2.1     01-17    POCT Blood Glucose.: 112 mg/dL (01-17-23 @ 07:11)  A1C with Estimated Average Glucose Result: 6.4 % (01-10-23 @ 10:02)  A1C with Estimated Average Glucose Result: 6.7 % (11-01-22 @ 10:01)  END CHEMFISH  START MEDSACTIVEMEDICATIONS  (STANDING):  ceFAZolin   IVPB 3000 milliGRAM(s) IV Intermittent every 8 hours  heparin   Injectable 5000 Unit(s) SubCutaneous every 8 hours  hyoscyamine SL 0.125 milliGRAM(s) SubLingual every 6 hours  influenza   Vaccine 0.5 milliLiter(s) IntraMuscular once  ketorolac   Injectable 30 milliGRAM(s) IV Push every 6 hours  lactated ringers. 1000 milliLiter(s) (150 mL/Hr) IV Continuous <Continuous>  ondansetron Injectable 4 milliGRAM(s) IV Push every 6 hours  pantoprazole  Injectable 40 milliGRAM(s) IV Push every 24 hours  sodium chloride 0.9% 1000 milliLiter(s) (250 mL/Hr) IV Continuous <Continuous>    MEDICATIONS  (PRN):  END MEDSACTIVE  START DIETORDERDiet, Clear Liquid:   Bariatric Clear Liquid (BARICLLIQ) (01-17-23 @ 17:13)  END DIETORDER  START ADMITDXMorbid or severe obesity due to excess calories    Body mass index [BMI] 50.0-59.9, adult    END ADMITDX  START IOFS  END IOFS  START SKINPUEND SKINPU    OBJECTIVE:    Vital Signs Last 24 Hrs  T(C): 36.9 (17 Jan 2023 20:51), Max: 36.9 (17 Jan 2023 18:36)  T(F): 98.5 (17 Jan 2023 20:51), Max: 98.5 (17 Jan 2023 20:51)  HR: 78 (17 Jan 2023 23:17) (78 - 100)  BP: 144/86 (17 Jan 2023 20:51) (135/80 - 159/88)  BP(mean): 110 (17 Jan 2023 14:45) (97 - 114)  RR: 18 (17 Jan 2023 20:51) (12 - 18)  SpO2: 92% (17 Jan 2023 23:17) (92% - 98%)    Parameters below as of 17 Jan 2023 20:51  Patient On (Oxygen Delivery Method): room air    I&O's Detail    17 Jan 2023 07:01  -  18 Jan 2023 00:04  --------------------------------------------------------  IN:    Lactated Ringers: 150 mL    Oral Fluid: 120 mL    sodium chloride 0.9% w/ Additives: 500 mL  Total IN: 770 mL    OUT:    Voided (mL): 400 mL  Total OUT: 400 mL    Total NET: 370 mL      MEDICATIONS  (STANDING):  ceFAZolin   IVPB 3000 milliGRAM(s) IV Intermittent every 8 hours  heparin   Injectable 5000 Unit(s) SubCutaneous every 8 hours  hyoscyamine SL 0.125 milliGRAM(s) SubLingual every 6 hours  influenza   Vaccine 0.5 milliLiter(s) IntraMuscular once  ketorolac   Injectable 30 milliGRAM(s) IV Push every 6 hours  lactated ringers. 1000 milliLiter(s) (150 mL/Hr) IV Continuous <Continuous>  ondansetron Injectable 4 milliGRAM(s) IV Push every 6 hours  pantoprazole  Injectable 40 milliGRAM(s) IV Push every 24 hours  sodium chloride 0.9% 1000 milliLiter(s) (250 mL/Hr) IV Continuous <Continuous>    MEDICATIONS  (PRN):      PHYSICAL EXAM:  Constitutional: A&Ox3, NAD  Respiratory: Unlabored breathing  Abdomen: Soft, nondistended, appropriately tender, port site dressings c/d/i  Extremities: WWP, BRYANT spontaneously    LABS:                        12.7   15.85 )-----------( 295      ( 17 Jan 2023 12:43 )             41.3     01-17    137  |  101  |  8   ----------------------------<  166<H>  4.1   |  21<L>  |  0.70    Ca    9.1      17 Jan 2023 12:43  Phos  3.1     01-17  Mg     2.1     01-17                IMAGING:     Surgery Progress Note  Patient is a 47y old  Female who presents with a chief complaint of "sleeve gastrectomy" (01 Nov 2022 09:59)      Overnight Events: No acute events overnight.  SUBJECTIVE: Patient seen and examined at bedside with surgical team, patient without complaints. Tolerating clear liquid diet without nausea. No flatus, no BM. Voiding. +Ambulating/OOB. Denies fever, chills, CP, SOB nausea, vomiting, dizziness.    START [id number]    137  |  101  |  8   ----------------------------<  166<H>  4.1   |  21<L>  |  0.70    Ca    9.1      17 Jan 2023 12:43  Phos  3.1     01-17  Mg     2.1     01-17    POCT Blood Glucose.: 112 mg/dL (01-17-23 @ 07:11)  A1C with Estimated Average Glucose Result: 6.4 % (01-10-23 @ 10:02)  A1C with Estimated Average Glucose Result: 6.7 % (11-01-22 @ 10:01)  END CHEMFISH  START MEDSACTIVEMEDICATIONS  (STANDING):  ceFAZolin   IVPB 3000 milliGRAM(s) IV Intermittent every 8 hours  heparin   Injectable 5000 Unit(s) SubCutaneous every 8 hours  hyoscyamine SL 0.125 milliGRAM(s) SubLingual every 6 hours  influenza   Vaccine 0.5 milliLiter(s) IntraMuscular once  ketorolac   Injectable 30 milliGRAM(s) IV Push every 6 hours  lactated ringers. 1000 milliLiter(s) (150 mL/Hr) IV Continuous <Continuous>  ondansetron Injectable 4 milliGRAM(s) IV Push every 6 hours  pantoprazole  Injectable 40 milliGRAM(s) IV Push every 24 hours  sodium chloride 0.9% 1000 milliLiter(s) (250 mL/Hr) IV Continuous <Continuous>    MEDICATIONS  (PRN):  END MEDSACTIVE  START DIETORDERDiet, Clear Liquid:   Bariatric Clear Liquid (BARICLLIQ) (01-17-23 @ 17:13)  END DIETORDER  START ADMITDXMorbid or severe obesity due to excess calories    Body mass index [BMI] 50.0-59.9, adult    END ADMITDX  START IOFS  END IOFS  START SKINPUEND SKINPU    OBJECTIVE:    Vital Signs Last 24 Hrs  T(C): 36.9 (17 Jan 2023 20:51), Max: 36.9 (17 Jan 2023 18:36)  T(F): 98.5 (17 Jan 2023 20:51), Max: 98.5 (17 Jan 2023 20:51)  HR: 78 (17 Jan 2023 23:17) (78 - 100)  BP: 144/86 (17 Jan 2023 20:51) (135/80 - 159/88)  BP(mean): 110 (17 Jan 2023 14:45) (97 - 114)  RR: 18 (17 Jan 2023 20:51) (12 - 18)  SpO2: 92% (17 Jan 2023 23:17) (92% - 98%)    Parameters below as of 17 Jan 2023 20:51  Patient On (Oxygen Delivery Method): room air    I&O's Detail    17 Jan 2023 07:01  -  18 Jan 2023 00:04  --------------------------------------------------------  IN:    Lactated Ringers: 150 mL    Oral Fluid: 120 mL    sodium chloride 0.9% w/ Additives: 500 mL  Total IN: 770 mL    OUT:    Voided (mL): 400 mL  Total OUT: 400 mL    Total NET: 370 mL      MEDICATIONS  (STANDING):  ceFAZolin   IVPB 3000 milliGRAM(s) IV Intermittent every 8 hours  heparin   Injectable 5000 Unit(s) SubCutaneous every 8 hours  hyoscyamine SL 0.125 milliGRAM(s) SubLingual every 6 hours  influenza   Vaccine 0.5 milliLiter(s) IntraMuscular once  ketorolac   Injectable 30 milliGRAM(s) IV Push every 6 hours  lactated ringers. 1000 milliLiter(s) (150 mL/Hr) IV Continuous <Continuous>  ondansetron Injectable 4 milliGRAM(s) IV Push every 6 hours  pantoprazole  Injectable 40 milliGRAM(s) IV Push every 24 hours  sodium chloride 0.9% 1000 milliLiter(s) (250 mL/Hr) IV Continuous <Continuous>    MEDICATIONS  (PRN):      PHYSICAL EXAM:  Constitutional: A&Ox3, NAD  Respiratory: Unlabored breathing  Abdomen: Soft, nondistended, appropriately tender, port site dressings c/d/i  Extremities: WWP, BRYANT spontaneously    LABS:                        12.7   15.85 )-----------( 295      ( 17 Jan 2023 12:43 )             41.3     01-17    137  |  101  |  8   ----------------------------<  166<H>  4.1   |  21<L>  |  0.70    Ca    9.1      17 Jan 2023 12:43  Phos  3.1     01-17  Mg     2.1     01-17                IMAGING:     Surgery Progress Note  Patient is a 47y old  Female who presents with a chief complaint of "sleeve gastrectomy" (01 Nov 2022 09:59)      Overnight Events: No acute events overnight.  SUBJECTIVE: Patient seen and examined at bedside with surgical team, patient without complaints. Tolerating clear liquid diet without nausea. No flatus, no BM. Voiding. +Ambulating/OOB. Denies fever, chills, CP, SOB nausea, vomiting, dizziness.    OBJECTIVE:    Vital Signs Last 24 Hrs  T(C): 36.9 (17 Jan 2023 20:51), Max: 36.9 (17 Jan 2023 18:36)  T(F): 98.5 (17 Jan 2023 20:51), Max: 98.5 (17 Jan 2023 20:51)  HR: 78 (17 Jan 2023 23:17) (78 - 100)  BP: 144/86 (17 Jan 2023 20:51) (135/80 - 159/88)  BP(mean): 110 (17 Jan 2023 14:45) (97 - 114)  RR: 18 (17 Jan 2023 20:51) (12 - 18)  SpO2: 92% (17 Jan 2023 23:17) (92% - 98%)    Parameters below as of 17 Jan 2023 20:51  Patient On (Oxygen Delivery Method): room air    I&O's Detail    17 Jan 2023 07:01  -  18 Jan 2023 00:04  --------------------------------------------------------  IN:    Lactated Ringers: 150 mL    Oral Fluid: 120 mL    sodium chloride 0.9% w/ Additives: 500 mL  Total IN: 770 mL    OUT:    Voided (mL): 400 mL  Total OUT: 400 mL    Total NET: 370 mL      MEDICATIONS  (STANDING):  ceFAZolin   IVPB 3000 milliGRAM(s) IV Intermittent every 8 hours  heparin   Injectable 5000 Unit(s) SubCutaneous every 8 hours  hyoscyamine SL 0.125 milliGRAM(s) SubLingual every 6 hours  influenza   Vaccine 0.5 milliLiter(s) IntraMuscular once  ketorolac   Injectable 30 milliGRAM(s) IV Push every 6 hours  lactated ringers. 1000 milliLiter(s) (150 mL/Hr) IV Continuous <Continuous>  ondansetron Injectable 4 milliGRAM(s) IV Push every 6 hours  pantoprazole  Injectable 40 milliGRAM(s) IV Push every 24 hours  sodium chloride 0.9% 1000 milliLiter(s) (250 mL/Hr) IV Continuous <Continuous>      PHYSICAL EXAM:  Constitutional: A&Ox3, NAD  Respiratory: Unlabored breathing  Abdomen: Soft, nondistended, appropriately tender, port site dressings c/d/i  Extremities: WWP, BRYANT spontaneously    LABS:                        12.7   15.85 )-----------( 295      ( 17 Jan 2023 12:43 )             41.3     01-17    137  |  101  |  8   ----------------------------<  166<H>  4.1   |  21<L>  |  0.70    Ca    9.1      17 Jan 2023 12:43  Phos  3.1     01-17  Mg     2.1     01-17

## 2023-01-18 NOTE — DISCHARGE NOTE NURSING/CASE MANAGEMENT/SOCIAL WORK - PATIENT PORTAL LINK FT
You can access the FollowMyHealth Patient Portal offered by [facility] by registering at the following website: [email or web address]. By joining [employer]’s FollowMyHealth portal, you will also be able to view your health information using other applications (apps) compatible with our system.

## 2023-01-18 NOTE — DISCHARGE NOTE PROVIDER - NSDCFUSCHEDAPPT_GEN_ALL_CORE_FT
[facility]  Froylan Hughes  Scheduled Appointment: 02/06/2023     Prem Love  [place] [facility]  [address]  Scheduled Appointment: 01/30/2023    [facility]  Froylan CROW [facility]  Scheduled Appointment: 02/06/2023

## 2023-01-18 NOTE — DISCHARGE NOTE PROVIDER - NSDCFUADDINST_GEN_ALL_CORE_FT
Additional instructions as explained and outlined the gastric sleeve instructions. No exercise, no heavy lifting, call office for shortness for breath chest pain, calf pain and swelling, increase pain and drainage from abdominal incision sites. Bariatric full diet as described above.  Do not take any whole large pills.  Please crush medications, open granules or take suspensions. Call office for immediate medical/surgical concerns. bariatric surgery, bariatric diet, increase water intake, nutritional supplement, follow up care, physical activity.

## 2023-01-18 NOTE — DIETITIAN INITIAL EVALUATION ADULT - REASON FOR ADMISSION
Body mass index [BMI] 50.0-59.9, adult    Morbid or severe obesity due to excess calories    Chart reviewed, events noted. This is a "47y old Female w/ PMH morbid obesity, moderate NICOLÁS - no CPAP, now s/p lap sleeve gastrectomy and lap darshan 1/17."

## 2023-01-18 NOTE — DISCHARGE NOTE PROVIDER - CARE PROVIDER_API CALL
Prem Love  General Surgery  [address] [place] [address], [address]  [place], [place] [id number]  Phone: [phone]  Fax: [phone]  Follow Up Time:

## 2023-01-18 NOTE — DIETITIAN INITIAL EVALUATION ADULT - EDUCATION DIETARY MODIFICATIONS
1. Laparoscopic  sleeve gastrectomy recommendations reviewed/reinforced (discharge instruction handout references). Bariatric full liquid diet reviewed- sugar free, caffeine free, no carbonated beverages, low fat, no straws, no chewing gum, 6 small meals/day gradually increasing volume, and sipping beverages slowly, no water during meals- drink water 1 hour before or after meals, meeting hydration and protein needs. Discussed vitamin/mineral supplement compliance as discussed above. 2. RD to remain available to reinforce nutrition education as requested by patient/family/caregiver./teach back/(2) meets goals/outcomes/verbalization

## 2023-01-18 NOTE — PHARMACOTHERAPY INTERVENTION NOTE - COMMENTS
Sleeve gastrectomy scheduled for 1/18/2023.  Patient medication reconciliation completed. Patient currently not taking any medications.    Patient was instructed to use crushed, dissolvable, chewable, or liquid formulations of medications for 1 month after surgery. Patient was informed to take daily multivitamins post surgically (plans on taking Barimelts). Patient reeducated on NSAID avoidance (ibuprofen, ASA, naproxen, aleve) as they increase risk of GI bleeding; may use APAP for mild pain otherwise contact prescriber for consult. Patient was informed on indications and directions for administration for acetaminophen liquid, levsin, zofran ODT, lovenox SQ and omeprazole . Pt. mentioned she purchased simethicone for gas. Patient was instructed to take the medications as follows:    Continue vitamins/supplements in chewable/dissolvable forms (plans for getting Barimelts)  Continue lovenox injection for 2 weeks post surgery   Continue omeprazole DR capsules 1 month post surgery  Acetaminophen, levsin, zofran as needed     Yolis Rosales PharmD  PGY-1 Resident   [facility] [id number]/Teams      Sleeve gastrectomy scheduled for 1/17/2023.  Patient medication reconciliation completed. Patient currently not taking any medications.    Patient was instructed to use crushed, dissolvable, chewable, or liquid formulations of medications for 1 month after surgery. Patient was informed to take daily multivitamins post surgically (plans on taking Barimelts). Patient reeducated on NSAID avoidance (ibuprofen, ASA, naproxen, aleve) as they increase risk of GI bleeding; may use APAP for mild pain otherwise contact prescriber for consult. Patient was informed on indications and directions for administration for acetaminophen liquid, levsin, zofran ODT, lovenox SQ and omeprazole . Pt. mentioned she purchased simethicone for gas. Patient was instructed to take the medications as follows:    Continue vitamins/supplements in chewable/dissolvable forms (plans for getting Barimelts)  Continue lovenox injection for 2 weeks post surgery   Continue omeprazole DR capsules 1 month post surgery  Acetaminophen, levsin, zofran as needed     Yolis FuentesD  PGY-1 Resident   [facility] [id number]/Teams

## 2023-01-18 NOTE — DISCHARGE NOTE PROVIDER - NSRESEARCHGRANT_HIDDEN_GEN_A_CORE
Health Maintenance Summary     Topic Due On Due Status Completed On    IMMUNIZATION - DTaP/Tdap/Td Aug 18, 2023 Not Due Aug 18, 2013    Immunization-Influenza Sep 1, 2017 Not Due           Patient is up to date, no discussion needed .     Yes

## 2023-01-18 NOTE — DIETITIAN INITIAL EVALUATION ADULT - ORAL INTAKE PTA/DIET HISTORY
Pt reports following a full liquid diet for 2 weeks PTA as instructed by outpatient RD. Pt reports having Pt denies chewing/swallowing difficulty, nausea, vomiting, diarrhea, constipation. NKFA noted.  Pt reports following a full liquid diet for 2 weeks PTA as instructed by outpatient RD. Pt reports having Premier Protein shakes, yogurt, tomato soup. Pt denies chewing/swallowing difficulty, nausea, vomiting, diarrhea, constipation. NKFA noted.

## 2023-01-18 NOTE — DIETITIAN INITIAL EVALUATION ADULT - OTHER INFO
Pt with multiple previous wt loss attempts per chart and was unable to lose and maintain significant wt loss. Per chart, pt met with outpatient RD and weighed  pounds (X). Pre-surgical wt (H&P) noted as 386 pounds (11/1/22). Current wt of 373 pounds (1/17/23).   Pt with multiple previous wt loss attempts per chart and was unable to lose and maintain significant wt loss. PPre-surgical wt (H&P) noted as 386 pounds (11/1/22). Current wt of 373 pounds (1/17/23).

## 2023-01-18 NOTE — DISCHARGE NOTE PROVIDER - INSTRUCTIONS
Bariatric phase 1 full liquid diet starting tomorrow for 2 weeks, then thin puree for 4 weeks.  No carbonated beverages. Avoid liquids with sugar and carbohydrates. Follow up with your dietician in 30 days.

## 2023-01-18 NOTE — DISCHARGE NOTE PROVIDER - NSDCMRMEDTOKEN_GEN_ALL_CORE_FT
acetaminophen 500 mg/15 mL oral liquid: 15 milliliter(s) orally every 6 hours, As Needed   hyoscyamine 0.125 mg sublingual tablet: 1 tab(s) sublingual every 6 hours MDD:4  Lovenox 40 mg/0.4 mL injectable solution: 40 milligram(s) subcutaneously once a day   No Home Medications:   omeprazole 40 mg oral delayed release capsule: 1 cap(s) orally once a day MDD:1 open and mix in applesauce  ondansetron 4 mg oral tablet, disintegratin tab(s) orally every 6 hours, As Needed    acetaminophen 500 mg/15 mL oral liquid: 15 milliliter(s) orally every 6 hours, As Needed   hyoscyamine 0.125 mg sublingual tablet: 1 tab(s) sublingual every 6 hours MDD:4  Lovenox 40 mg/0.4 mL injectable solution: 40 milligram(s) subcutaneously once a day   omeprazole 40 mg oral delayed release capsule: 1 cap(s) orally once a day MDD:1 open and mix in applesauce  ondansetron 4 mg oral tablet, disintegratin tab(s) orally every 6 hours, As Needed   simethicone 80 mg oral tablet, chewable: 1 tab(s) orally 3 times a day

## 2023-01-18 NOTE — PROGRESS NOTE ADULT - ASSESSMENT
Patient is a 47y old Female w/ PMH morbid obesity, moderate NICOLÁS - no CPAP, now s/p lap sleeve gastrectomy and lap darshan. Recovering appropriately.    Plan:  - bariatric postop protocol  - pain control  -  jac clears   - OOB/ambulate w/ assistance  - DVT ppx  - AM labs  - plan for dc today      [facility]  [phone].   Patient is a 47y old Female w/ PMH morbid obesity, moderate NICOLÁS - no CPAP, now s/p lap sleeve gastrectomy and lap darshan 1/17. Recovering appropriately.    Plan:  - bariatric postop protocol  - pain control  - jac clears   - OOB/ambulate  - DVT ppx  - f/u AM labs  - plan for dc today      [facility]  [phone]

## 2023-01-18 NOTE — DIETITIAN INITIAL EVALUATION ADULT - PERTINENT LABORATORY DATA
01-18    138  |  103  |  7   ----------------------------<  106<H>  4.6   |  23  |  0.70    Ca    8.9      18 Jan 2023 08:33  Phos  3.1     01-17  Mg     2.1     01-17    A1C with Estimated Average Glucose Result: 6.4 % (01-10-23 @ 10:02)  A1C with Estimated Average Glucose Result: 6.7 % (11-01-22 @ 10:01)

## 2023-01-18 NOTE — DISCHARGE NOTE PROVIDER - HOSPITAL COURSE
On 1/17, patient who has a history of moderate NICOLÁS (no CPAP) and morbid obesity with BMI 62.3 underwent Laparoscopic Sleeve Gastrectomy. Bariatric ERAS protocol followed to include preoperative and perioperative use of DVT and SSI prophylaxis as well as multi-modal non-opioid analgesics. Patient tolerated the procedure well extubated in the operating room then transferred to the PACU in stable condition. Once hemodynamically stable and effective pain control the patient was able to ambulate with assistance. Pt was also able to void within 4 hours following the procedure. The patient was later transferred to a bariatric unit and placed on bedside continuous pulse oximetry. Pain management included IV multi-modal non-opioid analgesia then transitioned to liquid as needed. The patient tolerated bariatric clear liquid the evening of surgery.    On POD #1 patient remained stable with no acute events overnight, has effective pain control. Denies nausea and vomiting. Patient is ambulating independently and voiding as expected. The rest of the hospital course was uneventful, patient met 8-Point Bariatric Surgery D/C criteria and subsequently cleared for discharge home on POD#1.  The patient was sent home with 2 weeks of Lovenox for prophylaxis. The [facility] VTE predicted risk stratification was calculated to be 11 and was not discharged home with extended VTE prophylaxis. The patient will follow a protocol-derived staged meal progression supervised by the dietitian in the outpatient setting. Patient will follow-up with Dr. Love in 7-10 days, medical doctor and dietitian in 30 days. All appropriate [facility] prior to discharge. Written discharge instruction explained and given to include VTE prevention. This is a 50 year obese female with hx of NICOLÁS on CPAP, gallstones, fatty liver and anemia. On 1/17/2023 underwent Laparoscopic Sleeve Gastrectomy and Cholecystectomy for morbid obesity, BMI 62.1. Bariatric ERAS protocol followed to include preoperative and perioperative use of DVT and SSI prophylaxis as well as multi-modal non-opioid analgesics. Patient tolerated the procedure well extubated in the operating room then transferred to the PACU in stable condition. Once hemodynamically stable and effective pain control the patient was able to ambulate with assistance. Pt was also able to void within 4 hours following the procedure. The patient was later transferred to a bariatric unit and placed on bedside continuous pulse oximetry. Pain management included IV multi-modal non-opioid analgesia then transitioned to liquid as needed. The patient tolerated bariatric clear liquid the evening of surgery.    On POD #1 patient remained stable with no cardio-pulmonary acute events overnight, has effective pain control however had some gas discomfort for which she received simethicone with relief. Denies nausea and vomiting. Patient is ambulating independently and voiding as expected. The rest of the hospital course was uneventful, patient met 8-Point Bariatric Surgery D/C criteria and subsequently cleared for discharge home on POD#1.  Lovenox 40mg QD x 14 days for prophylaxis [facility] VTE predicted risk stratification low. Patient education with teach back. The patient will follow a protocol-derived staged meal progression supervised by the dietitian in the outpatient setting. Patient will follow-up with Dr. Love in 7-10 days, medical doctor and dietitian in 30 days. All appropriate [facility] obtained from [facility] prior to discharge. Written discharge instruction explained and given to include VTE prevention.

## 2023-01-25 PROBLEM — K76.0 FATTY (CHANGE OF) LIVER, NOT ELSEWHERE CLASSIFIED: Chronic | Status: ACTIVE | Noted: 2023-01-10

## 2023-01-25 PROBLEM — I10 ESSENTIAL (PRIMARY) HYPERTENSION: Chronic | Status: ACTIVE | Noted: 2023-01-10

## 2023-01-26 LAB — SURGICAL PATHOLOGY STUDY: SIGNIFICANT CHANGE UP

## 2023-01-27 ENCOUNTER — NON-APPOINTMENT (OUTPATIENT)
Age: 48
End: 2023-01-27

## 2023-01-29 ENCOUNTER — OUTPATIENT (OUTPATIENT)
Dept: OUTPATIENT SERVICES | Facility: HOSPITAL | Age: 48
LOS: 1 days | Discharge: HOME | End: 2023-01-29

## 2023-01-29 DIAGNOSIS — Z98.890 OTHER SPECIFIED POSTPROCEDURAL STATES: Chronic | ICD-10-CM

## 2023-01-29 DIAGNOSIS — E88.01 ALPHA-1-ANTITRYPSIN DEFICIENCY: ICD-10-CM

## 2023-01-30 ENCOUNTER — APPOINTMENT (OUTPATIENT)
Dept: SURGERY | Facility: CLINIC | Age: 48
End: 2023-01-30
Payer: MEDICARE

## 2023-01-30 VITALS
DIASTOLIC BLOOD PRESSURE: 83 MMHG | HEIGHT: 65 IN | BODY MASS INDEX: 48.82 KG/M2 | RESPIRATION RATE: 18 BRPM | HEART RATE: 74 BPM | WEIGHT: 293 LBS | OXYGEN SATURATION: 98 % | TEMPERATURE: 97.8 F | SYSTOLIC BLOOD PRESSURE: 149 MMHG

## 2023-01-30 PROCEDURE — 99024 POSTOP FOLLOW-UP VISIT: CPT

## 2023-01-30 NOTE — PHYSICAL EXAM
[Obese, well nourished, in no acute distress] : obese, well nourished, in no acute distress [Normal] : affect appropriate [de-identified] : Normal respirations [de-identified] : Soft, nontender, nondistended.  Incisions well-healed.

## 2023-01-30 NOTE — HISTORY OF PRESENT ILLNESS
[de-identified] : Sybil Hunt is a 47 year old female who presents for 1st post op visit s/p Laparoscopic Sleeve Gastrectomy and concomitant cholecystectomy on 01/17/2023.\par Recovering comfortably without major complaints.  Minimal incisional pain.  No nausea.  Tolerating bariatric liquids, including protein shakes, without dysphagia or GERD.  Ambulating well.  No fevers or chills reported.\par She does report noting some vaginal spotting after urinating.  She reports no blood clots, no lightheadedness or weakness.  She has a known history of irregular periods.  States she has not had a regular period in 2 years.

## 2023-01-30 NOTE — PLAN
[FreeTextEntry1] : [] Advance to pureed foods as per bariatric diet instructions at 2 weeks\par [] emphasized importance of maintaining excellent hydration, monitoring urine output and color, and keeping a bottle of water available at all times\par [] 1-2 protein shakes per day \par [] walk as much as tolerated\par [] Advised to follow-up with gynecologist if vaginal spotting continues.\par \par F/u 2 weeks

## 2023-01-30 NOTE — ASSESSMENT
[FreeTextEntry1] : 47-year-old female recovering well status post laparoscopic sleeve gastrectomy and cholecystectomy on 1/17/2023.\par She is experiencing some postoperative vaginal spotting, with a prior history of irregular periods.

## 2023-01-31 ENCOUNTER — APPOINTMENT (OUTPATIENT)
Dept: OBGYN | Facility: CLINIC | Age: 48
End: 2023-01-31
Payer: MEDICARE

## 2023-01-31 ENCOUNTER — NON-APPOINTMENT (OUTPATIENT)
Age: 48
End: 2023-01-31

## 2023-01-31 VITALS
HEART RATE: 85 BPM | DIASTOLIC BLOOD PRESSURE: 85 MMHG | HEIGHT: 65 IN | SYSTOLIC BLOOD PRESSURE: 146 MMHG | BODY MASS INDEX: 48.82 KG/M2 | WEIGHT: 293 LBS

## 2023-01-31 DIAGNOSIS — N93.8 OTHER SPECIFIED ABNORMAL UTERINE AND VAGINAL BLEEDING: ICD-10-CM

## 2023-01-31 PROCEDURE — 99213 OFFICE O/P EST LOW 20 MIN: CPT

## 2023-01-31 NOTE — DISCUSSION/SUMMARY
[FreeTextEntry1] : A - DUB\par      Morbid Obesity\par \par P- f/u prn\par      pt reassured, as biopsy from August 2022 was not pre-cancerous\par     bleeding is most likely due to sudden weight loss secondary to bariatric surgery

## 2023-01-31 NOTE — HISTORY OF PRESENT ILLNESS
[FreeTextEntry1] : pt presents for follow up,  , pt  is s/p gastric sleeve, 1/17/23, along with lap darshan on same day. pt has hx of oligomenorrhea, with bleeding approx. once per year, pt recently had EMB in Aug. 2022 for oligomenorrhea, combined with thickened endometrium on sono, which resulted as disordered proliferative endometrium . \par today pt  is concerned over vaginal bleeding since procedure. pt was on Lovenox, but told to stop Lovenox  when she reported bleeding episode to surgeons, who also told her to f/u with GYN. \par pt has lost 30lbs. since procedure.

## 2023-01-31 NOTE — PHYSICAL EXAM
[Labia Majora] : normal [Labia Minora] : normal [Scant] : There was scant vaginal bleeding [Normal] : normal [Uterine Adnexae] : non-palpable

## 2023-02-06 ENCOUNTER — APPOINTMENT (OUTPATIENT)
Dept: HEMATOLOGY ONCOLOGY | Facility: CLINIC | Age: 48
End: 2023-02-06

## 2023-02-06 DIAGNOSIS — L53.9 ERYTHEMATOUS CONDITION, UNSPECIFIED: ICD-10-CM

## 2023-02-06 DIAGNOSIS — N92.6 IRREGULAR MENSTRUATION, UNSPECIFIED: ICD-10-CM

## 2023-02-06 DIAGNOSIS — N92.1 EXCESSIVE AND FREQUENT MENSTRUATION WITH IRREGULAR CYCLE: ICD-10-CM

## 2023-02-06 DIAGNOSIS — N91.2 AMENORRHEA, UNSPECIFIED: ICD-10-CM

## 2023-02-06 NOTE — HISTORY OF PRESENT ILLNESS
[de-identified] : 45 yo F with a PMH of vertigo, morbid obesity, HTN, HLD, and prediabetes who presents for initial consultation.\par She states she was here because she had elevated alpha and beta globulins.\par From prior notes, she was sent as a preoperative clearance prior to bariatric surgery.\par \par Of note, she has been obese since at least age 19, when she was ~ 180 lbs, and increased after her pregnancy at that time to 250, and she was largely at that weight for\par  10 -15 years. within past 2 years weight has exceeded 300 lbs; today 11/10/2022: weight 170.7 kg\par About 2 years ago, she was told she may have a fatty liver because she was found to have transaminitis.\par She had an EGD in preparation for bariatric surgery that showed grade B esophagitis and questionable grade I varices. She was referred to a hepatologist for further testing to r/o cirrhosis, which was ruled out.\par During preoperative testing, she was found to have a minimally elevated alpha and beta globulin levels with minimally elevated IgA levels.\par She denies any fevers, chills, night sweats, arthritis or arthritic pains, nausea/vomiting, diarrhea, constipation, CP, SOB, cough, rash, or itch. She has some slight swelling of her legs.\par She is scheduled for her bariatric surgery in 5 days. [de-identified] : See HPI; she is referred by gastric surgery for review of labs. Patient has pre diabetes, mild hypertension, elevated liver function studies probable fatty liver [Date: ____________] : Patient's last distress assessment performed on [unfilled]. [1 - Distress Level] : Distress Level: 1 [90: Able to carry normal activity; minor signs or symptoms of disease.] : 90: Able to carry normal activity; minor signs or symptoms of disease.  [ECOG Performance Status: 1 - Restricted in physically strenuous activity but ambulatory and able to carry out work of a light or sedentary nature] : Performance Status: 1 - Restricted in physically strenuous activity but ambulatory and able to carry out work of a light or sedentary nature, e.g., light house work, office work

## 2023-02-06 NOTE — PHYSICAL EXAM
[Restricted in physically strenuous activity but ambulatory and able to carry out work of a light or sedentary nature] : Status 1- Restricted in physically strenuous activity but ambulatory and able to carry out work of a light or sedentary nature, e.g., light house work, office work [Obese] : obese [Normal] : affect appropriate [de-identified] : morbidly obese

## 2023-02-07 ENCOUNTER — APPOINTMENT (OUTPATIENT)
Dept: SURGERY | Facility: CLINIC | Age: 48
End: 2023-02-07

## 2023-02-07 VITALS
RESPIRATION RATE: 18 BRPM | HEIGHT: 65 IN | DIASTOLIC BLOOD PRESSURE: 83 MMHG | SYSTOLIC BLOOD PRESSURE: 158 MMHG | HEART RATE: 89 BPM | OXYGEN SATURATION: 99 % | TEMPERATURE: 97 F

## 2023-02-13 ENCOUNTER — APPOINTMENT (OUTPATIENT)
Dept: SURGERY | Facility: CLINIC | Age: 48
End: 2023-02-13
Payer: MEDICARE

## 2023-02-13 VITALS
SYSTOLIC BLOOD PRESSURE: 138 MMHG | OXYGEN SATURATION: 97 % | BODY MASS INDEX: 48.82 KG/M2 | HEART RATE: 82 BPM | TEMPERATURE: 96.5 F | WEIGHT: 293 LBS | HEIGHT: 65 IN | DIASTOLIC BLOOD PRESSURE: 81 MMHG | RESPIRATION RATE: 18 BRPM

## 2023-02-13 PROCEDURE — 99024 POSTOP FOLLOW-UP VISIT: CPT

## 2023-02-13 NOTE — PHYSICAL EXAM
[Obese, well nourished, in no acute distress] : obese, well nourished, in no acute distress [Normal] : affect appropriate [de-identified] : Normal respirations [de-identified] : Soft, nontender, nondistended.  Incisions well-healed.

## 2023-02-13 NOTE — ASSESSMENT
[FreeTextEntry1] : 47-year-old female recovering well status post laparoscopic sleeve gastrectomy and cholecystectomy on 1/17/2023.\par

## 2023-02-13 NOTE — HISTORY OF PRESENT ILLNESS
[de-identified] : Sybil Hunt is a 47 year old female who presents for 2nd post op visit s/p Laparoscopic Sleeve Gastrectomy and concomitant cholecystectomy on 01/17/2023. 1 month post op visit. \par \par She continues to do well, with good weight loss, and tolerating soft foods.  She feels she is getting appropriate hydration and protein daily.  She has not started an exercise regimen yet.  She reports no dysphagia, reflux, or abdominal pain.\par \par

## 2023-02-14 ENCOUNTER — APPOINTMENT (OUTPATIENT)
Dept: BARIATRICS | Facility: CLINIC | Age: 48
End: 2023-02-14

## 2023-02-18 ENCOUNTER — EMERGENCY (EMERGENCY)
Facility: HOSPITAL | Age: 48
LOS: 1 days | Discharge: HOME | End: 2023-02-18
Attending: EMERGENCY MEDICINE | Admitting: EMERGENCY MEDICINE
Payer: MEDICARE

## 2023-02-18 VITALS
RESPIRATION RATE: 16 BRPM | HEART RATE: 78 BPM | OXYGEN SATURATION: 100 % | HEIGHT: 65 IN | SYSTOLIC BLOOD PRESSURE: 156 MMHG | TEMPERATURE: 98 F | DIASTOLIC BLOOD PRESSURE: 85 MMHG

## 2023-02-18 DIAGNOSIS — Z98.890 OTHER SPECIFIED POSTPROCEDURAL STATES: Chronic | ICD-10-CM

## 2023-02-18 PROCEDURE — 99285 EMERGENCY DEPT VISIT HI MDM: CPT

## 2023-02-18 RX ORDER — CEPHALEXIN 500 MG
1 CAPSULE ORAL
Qty: 14 | Refills: 0
Start: 2023-02-18 | End: 2023-02-24

## 2023-02-18 RX ORDER — CEPHALEXIN 500 MG
10 CAPSULE ORAL
Qty: 140 | Refills: 0
Start: 2023-02-18 | End: 2023-02-24

## 2023-02-18 NOTE — ED PROVIDER NOTE - CLINICAL SUMMARY MEDICAL DECISION MAKING FREE TEXT BOX
48 y/o female with PMHx of HTN and Obesity s/p gastric sleeve 1/17/23 who presented to the ED for discharge from surgical site over the past day.   Concern for Wound infection/abscess/cellulitis  Less likely involving the gastric sleeve as exam showed more superficial involvement   Will obtain labs, CT, and Surgery consult

## 2023-02-18 NOTE — CHART NOTE - NSCHARTNOTEFT_GEN_A_CORE
Bariatric Surgery Update Note    Patient is a 47 year old female s/p lap sleeve gastrectomy and lap cholecystectomy with Dr. Love on 1/17 presenting with concerns for port site infection. Surgery alerted of the patient being in the ED. However, patient left AMA from the ED prior to CT imaging and prior to surgery being able to evaluate her.    Patient provided with script for PO abx and instructions to follow up with Dr. Love in the office by the ED.      General Surgery Consults  [id number]

## 2023-02-18 NOTE — ED PROVIDER NOTE - DIFFERENTIAL DIAGNOSIS
In order to meet Medicare requirements, the clinical documentation must support the information cited in the admission order.  Please be sure to provide detailed and clear documentation about the following in the admitting note/history and physical: Wound infection/abscess/cellulitis Differential Diagnosis

## 2023-02-18 NOTE — ED ADULT TRIAGE NOTE - CHIEF COMPLAINT QUOTE
Pt s/p gastric sleeve surgery January 17th c/o pain and swelling to surgical site. Pt endorsing drainage to site. Denies chills/fevers.

## 2023-02-18 NOTE — ED PROVIDER NOTE - NSFOLLOWUPINSTRUCTIONS_ED_ALL_ED_FT
YOU WERE SEEN FOR DISCHARGE FROM WOUND    YOU HAD TO LEAVE PRIOR TO WORK UP COMPLETION    YOU HAVE BEEN DIAGNOSED WITH LOCAL WOUND INFECTION    RETURN ONCE YOU ARE ABLE TO COMPLETE EVALUATION    REST AND PLENTY OF FLUIDS    CONTINUE KEFLEX 10mL 2 TIMES PER DAY    FOLLOW UP WITH YOUR BARIATRIC SURGEON WITHIN 1 WEEK.    FOLLOW UP WITH YOUR PRIMARY CARE PROVIDER WITHIN 1 WEEK.    RETURN TO THE EMERGENCY DEPARTMENT FOR ANY WORSENING DISCHARGE, FEVER, NAUSEA, VOMITING, OR FOR ANY WORSENING SYMPTOMS.

## 2023-02-18 NOTE — ED PROVIDER NOTE - OBJECTIVE STATEMENT
46 y/o female wigth 46 y/o female with PMHx of HTN and Obesity s/p gastric sleeve 1/17/23 who presented to the ED for discharge from surgical site over the past day. Pt states she had a gastric sleeve done on 1/17/23 and has since had a suture that was seen from the surgical site to her lower abd. Pt states it was then cut to the knot and the knot would rub on her clothes. Pt states over the past day having some discharge noted from the site. Pt denies any fever, chills, nausea, vomiting, SOB, chest pain, or abd pain. Pt denies any dysuria or diarrhea.

## 2023-02-18 NOTE — ED PROVIDER NOTE - PATIENT PORTAL LINK FT
You can access the FollowMyHealth Patient Portal offered by [facility] by registering at the following website: [email or web address]. By joining [email or web address] FollowMyHealth portal, you will also be able to view your health information using other applications (apps) compatible with our system.

## 2023-02-18 NOTE — ED PROVIDER NOTE - PROGRESS NOTE DETAILS
Dr. Lomas: Pt alerted team her son does not have a  and she needs to leave to get him. Pt has no fever, abd pain, vomiting, or diarrhea. Discussed risks and concern. Advised pt to return once her  can be straightened out and f/u with her surgeon. Dr. Lomas: Surgery consulted and requested CT which was ordered and they will see the pt.

## 2023-02-21 LAB
25(OH)D3 SERPL-MCNC: 30 NG/ML
ALBUMIN SERPL ELPH-MCNC: 4.3 G/DL
ALP BLD-CCNC: 82 U/L
ALT SERPL-CCNC: 55 U/L
ANION GAP SERPL CALC-SCNC: 14 MMOL/L
AST SERPL-CCNC: 47 U/L
BASOPHILS # BLD AUTO: 0.08 K/UL
BASOPHILS NFR BLD AUTO: 0.8 %
BILIRUB SERPL-MCNC: 0.3 MG/DL
BUN SERPL-MCNC: 12 MG/DL
CALCIUM SERPL-MCNC: 10.1 MG/DL
CHLORIDE SERPL-SCNC: 102 MMOL/L
CO2 SERPL-SCNC: 25 MMOL/L
CREAT SERPL-MCNC: 0.63 MG/DL
EGFR: 111 ML/MIN/1.73M2
EOSINOPHIL # BLD AUTO: 0.19 K/UL
EOSINOPHIL NFR BLD AUTO: 1.8 %
FERRITIN SERPL-MCNC: 41 NG/ML
FOLATE SERPL-MCNC: 14.4 NG/ML
GLUCOSE SERPL-MCNC: 93 MG/DL
HCT VFR BLD CALC: 41.9 %
HGB BLD-MCNC: 13.1 G/DL
IMM GRANULOCYTES NFR BLD AUTO: 0.4 %
IRON SATN MFR SERPL: 13 %
IRON SERPL-MCNC: 46 UG/DL
LYMPHOCYTES # BLD AUTO: 2.62 K/UL
LYMPHOCYTES NFR BLD AUTO: 25.1 %
MAN DIFF?: NORMAL
MCHC RBC-ENTMCNC: 27.4 PG
MCHC RBC-ENTMCNC: 31.3 GM/DL
MCV RBC AUTO: 87.7 FL
MONOCYTES # BLD AUTO: 1.06 K/UL
MONOCYTES NFR BLD AUTO: 10.2 %
NEUTROPHILS # BLD AUTO: 6.43 K/UL
NEUTROPHILS NFR BLD AUTO: 61.7 %
PLATELET # BLD AUTO: 306 K/UL
POTASSIUM SERPL-SCNC: 4.3 MMOL/L
PROT SERPL-MCNC: 7.5 G/DL
RBC # BLD: 4.78 M/UL
RBC # FLD: 14.5 %
SODIUM SERPL-SCNC: 141 MMOL/L
TIBC SERPL-MCNC: 348 UG/DL
UIBC SERPL-MCNC: 302 UG/DL
VIT B12 SERPL-MCNC: 789 PG/ML
WBC # FLD AUTO: 10.42 K/UL

## 2023-02-24 ENCOUNTER — APPOINTMENT (OUTPATIENT)
Dept: CARDIOLOGY | Facility: CLINIC | Age: 48
End: 2023-02-24

## 2023-02-24 LAB — VIT B1 SERPL-MCNC: 137.5 NMOL/L

## 2023-03-10 ENCOUNTER — APPOINTMENT (OUTPATIENT)
Dept: HEMATOLOGY ONCOLOGY | Facility: CLINIC | Age: 48
End: 2023-03-10

## 2023-03-10 DIAGNOSIS — D64.9 ANEMIA, UNSPECIFIED: ICD-10-CM

## 2023-03-10 DIAGNOSIS — D50.9 IRON DEFICIENCY ANEMIA, UNSPECIFIED: ICD-10-CM

## 2023-03-10 NOTE — END OF VISIT
[] : Fellow [FreeTextEntry3] : Patient with metabolic syndrome and possibly hyper inflammatory state;SPEP does not show monoclonal gammopathy; she has severe obesity and she is cleared form standpoint of hematology for bariatric surgery procedure

## 2023-03-10 NOTE — HISTORY OF PRESENT ILLNESS
[de-identified] : 45 yo F with a PMH of vertigo, morbid obesity, HTN, HLD, and prediabetes who presents for initial consultation.\par She states she was here because she had elevated alpha and beta globulins.\par From prior notes, she was sent as a preoperative clearance prior to bariatric surgery.\par \par Of note, she has been obese since at least age 19, when she was ~ 180 lbs, and increased after her pregnancy at that time to 250, and she was largely at that weight for\par  10 -15 years. within past 2 years weight has exceeded 300 lbs; today 11/10/2022: weight 170.7 kg\par About 2 years ago, she was told she may have a fatty liver because she was found to have transaminitis.\par She had an EGD in preparation for bariatric surgery that showed grade B esophagitis and questionable grade I varices. She was referred to a hepatologist for further testing to r/o cirrhosis, which was ruled out.\par During preoperative testing, she was found to have a minimally elevated alpha and beta globulin levels with minimally elevated IgA levels.\par She denies any fevers, chills, night sweats, arthritis or arthritic pains, nausea/vomiting, diarrhea, constipation, CP, SOB, cough, rash, or itch. She has some slight swelling of her legs.\par She is scheduled for her bariatric surgery in 5 days. [de-identified] : See HPI; she is referred by gastric surgery for review of labs. Patient has pre diabetes, mild hypertension, elevated liver function studies probable fatty liver [FreeTextEntry1] : first visit [Date: ____________] : Patient's last distress assessment performed on [unfilled]. [1 - Distress Level] : Distress Level: 1 [90: Able to carry normal activity; minor signs or symptoms of disease.] : 90: Able to carry normal activity; minor signs or symptoms of disease.  [ECOG Performance Status: 1 - Restricted in physically strenuous activity but ambulatory and able to carry out work of a light or sedentary nature] : Performance Status: 1 - Restricted in physically strenuous activity but ambulatory and able to carry out work of a light or sedentary nature, e.g., light house work, office work

## 2023-04-05 ENCOUNTER — APPOINTMENT (OUTPATIENT)
Dept: OBGYN | Facility: CLINIC | Age: 48
End: 2023-04-05
Payer: MEDICARE

## 2023-04-05 ENCOUNTER — APPOINTMENT (OUTPATIENT)
Dept: OBGYN | Facility: CLINIC | Age: 48
End: 2023-04-05

## 2023-04-05 DIAGNOSIS — N76.1 SUBACUTE AND CHRONIC VAGINITIS: ICD-10-CM

## 2023-04-05 PROCEDURE — 99213 OFFICE O/P EST LOW 20 MIN: CPT

## 2023-04-05 NOTE — PHYSICAL EXAM
[Vulvitis] : vulvitis [Labia Majora] : normal [Labia Minora] : normal [Discharge] : a  ~M vaginal discharge was present [Moderate] : moderate [Cory] : yellow [Thin] : thin [Normal] : normal

## 2023-04-05 NOTE — DISCUSSION/SUMMARY
[FreeTextEntry1] : A - subacute vaginitis r/o VVC vs. BV\par \par P- Terconazole vag. supp. x 3\par     Affirmed , Gen probe and Urine C&S done\par      if BV is present will additionally prescribe Metrogel.

## 2023-04-05 NOTE — HISTORY OF PRESENT ILLNESS
[FreeTextEntry1] : pt presents for follow up. for evaluation of vaginal 'burning', pt denies sexual intercourse since 2018. . pt has hx of oligomenorrhea,  with no recent menses.since Jan. 2023.  pt had EMB in Aug. 2022 - " disordered proliferative endometrium". \par pt reports taking a course of Keflex around the end of Jan. 2023.

## 2023-04-07 LAB
BACTERIA UR CULT: NORMAL
C TRACH RRNA SPEC QL NAA+PROBE: NOT DETECTED
CANDIDA VAG CYTO: NOT DETECTED
G VAGINALIS+PREV SP MTYP VAG QL MICRO: DETECTED
N GONORRHOEA RRNA SPEC QL NAA+PROBE: NOT DETECTED
SOURCE AMPLIFICATION: NORMAL
T VAGINALIS VAG QL WET PREP: DETECTED

## 2023-04-10 ENCOUNTER — NON-APPOINTMENT (OUTPATIENT)
Age: 48
End: 2023-04-10

## 2023-04-11 ENCOUNTER — NON-APPOINTMENT (OUTPATIENT)
Age: 48
End: 2023-04-11

## 2023-04-17 ENCOUNTER — APPOINTMENT (OUTPATIENT)
Dept: SURGERY | Facility: CLINIC | Age: 48
End: 2023-04-17
Payer: MEDICARE

## 2023-04-17 VITALS
RESPIRATION RATE: 17 BRPM | HEART RATE: 71 BPM | WEIGHT: 293 LBS | TEMPERATURE: 97.8 F | SYSTOLIC BLOOD PRESSURE: 147 MMHG | BODY MASS INDEX: 48.82 KG/M2 | OXYGEN SATURATION: 98 % | DIASTOLIC BLOOD PRESSURE: 92 MMHG | HEIGHT: 65 IN

## 2023-04-17 PROCEDURE — 99024 POSTOP FOLLOW-UP VISIT: CPT

## 2023-04-17 NOTE — HISTORY OF PRESENT ILLNESS
[de-identified] : RAFAEL is a 47 year old female here for 3-month postoperative visit s/p Laparoscopic sleeve gastrectomy and cholecystectomy on 01/17/23.\par She continues to do well and is losing weight appropriately.\par She does feel she is not drinking enough water (~30oz/day), feeling a bit dehydrated, and occasional dizziness when standing up.\par She otherwise reports exercising daily, walking, and using a fitness jack.\par She reports no dysphagia or reflux.\par

## 2023-04-17 NOTE — PLAN
[FreeTextEntry1] : [] 3-month labs ordered\par [] cont bariatric diet and consult with nutritionist as needed\par [] goal 30 min cardiovascular exercise daily, with some weight resistance training as tolerated\par [] continue multivitamins\par [] f/u 3 months

## 2023-04-17 NOTE — PHYSICAL EXAM
[Obese, well nourished, in no acute distress] : obese, well nourished, in no acute distress [Normal] : affect appropriate [de-identified] : Normal respirations [de-identified] : Soft, nontender, nondistended.  Incisions well-healed.

## 2023-04-18 LAB
25(OH)D3 SERPL-MCNC: 39.4 NG/ML
ALBUMIN SERPL ELPH-MCNC: 4.4 G/DL
ALP BLD-CCNC: 91 U/L
ALT SERPL-CCNC: 53 U/L
ANION GAP SERPL CALC-SCNC: 13 MMOL/L
AST SERPL-CCNC: 40 U/L
BASOPHILS # BLD AUTO: 0.06 K/UL
BASOPHILS NFR BLD AUTO: 0.6 %
BILIRUB SERPL-MCNC: 0.3 MG/DL
BUN SERPL-MCNC: 8 MG/DL
CALCIUM SERPL-MCNC: 10 MG/DL
CHLORIDE SERPL-SCNC: 103 MMOL/L
CO2 SERPL-SCNC: 25 MMOL/L
CREAT SERPL-MCNC: 0.66 MG/DL
EGFR: 109 ML/MIN/1.73M2
EOSINOPHIL # BLD AUTO: 0.16 K/UL
EOSINOPHIL NFR BLD AUTO: 1.7 %
ESTIMATED AVERAGE GLUCOSE: 123 MG/DL
FERRITIN SERPL-MCNC: 59 NG/ML
FOLATE SERPL-MCNC: 19.6 NG/ML
GLUCOSE SERPL-MCNC: 94 MG/DL
HBA1C MFR BLD HPLC: 5.9 %
HCT VFR BLD CALC: 43.4 %
HGB BLD-MCNC: 13.3 G/DL
IMM GRANULOCYTES NFR BLD AUTO: 0.3 %
IRON SATN MFR SERPL: 22 %
IRON SERPL-MCNC: 71 UG/DL
LYMPHOCYTES # BLD AUTO: 2.35 K/UL
LYMPHOCYTES NFR BLD AUTO: 25.1 %
MAN DIFF?: NORMAL
MCHC RBC-ENTMCNC: 27.2 PG
MCHC RBC-ENTMCNC: 30.6 GM/DL
MCV RBC AUTO: 88.8 FL
MONOCYTES # BLD AUTO: 0.68 K/UL
MONOCYTES NFR BLD AUTO: 7.3 %
NEUTROPHILS # BLD AUTO: 6.08 K/UL
NEUTROPHILS NFR BLD AUTO: 65 %
PLATELET # BLD AUTO: 328 K/UL
POTASSIUM SERPL-SCNC: 4.4 MMOL/L
PROT SERPL-MCNC: 7.4 G/DL
RBC # BLD: 4.89 M/UL
RBC # FLD: 14.2 %
SODIUM SERPL-SCNC: 141 MMOL/L
TIBC SERPL-MCNC: 323 UG/DL
UIBC SERPL-MCNC: 252 UG/DL
VIT B12 SERPL-MCNC: 1040 PG/ML
WBC # FLD AUTO: 9.36 K/UL

## 2023-04-20 LAB — VIT B1 SERPL-MCNC: 141.7 NMOL/L

## 2023-07-17 ENCOUNTER — APPOINTMENT (OUTPATIENT)
Dept: SURGERY | Facility: CLINIC | Age: 48
End: 2023-07-17
Payer: MEDICARE

## 2023-07-17 VITALS
WEIGHT: 292.31 LBS | OXYGEN SATURATION: 98 % | TEMPERATURE: 98.7 F | RESPIRATION RATE: 18 BRPM | DIASTOLIC BLOOD PRESSURE: 87 MMHG | HEART RATE: 70 BPM | BODY MASS INDEX: 48.7 KG/M2 | HEIGHT: 65 IN | SYSTOLIC BLOOD PRESSURE: 139 MMHG

## 2023-07-17 PROCEDURE — 99213 OFFICE O/P EST LOW 20 MIN: CPT

## 2023-07-17 NOTE — PLAN
[FreeTextEntry1] : [] 6-month labs ordered\par [] cont bariatric diet and consult with nutritionist as needed\par [] goal 30 min cardiovascular exercise daily, with some weight resistance training as tolerated\par [] continue multivitamins\par [] f/u 3 months

## 2023-07-17 NOTE — ASSESSMENT
[FreeTextEntry1] : 47-year-old female doing well status post laparoscopic sleeve gastrectomy and cholecystectomy on 1/17/2023.\par

## 2023-07-17 NOTE — PHYSICAL EXAM
[Obese, well nourished, in no acute distress] : obese, well nourished, in no acute distress [Normal] : affect appropriate [de-identified] : Normal respirations [de-identified] : Soft, nontender, nondistended.  Incisions well-healed.

## 2023-07-17 NOTE — HISTORY OF PRESENT ILLNESS
[de-identified] : RAFAEL is a 47 year old female here for 6-month postoperative visit s/p Laparoscopic sleeve gastrectomy on 01/17/23.\par He continues to do very well, with excellent weight loss of over 80 pounds.  She reports no dysphagia, reflux, or abdominal pain.  She continues to maintain small portions and has good restriction.  She exercises regularly, walking a mile at a time.  She continues to take her multivitamins.\par

## 2023-10-13 LAB
25(OH)D3 SERPL-MCNC: 26.4 NG/ML
ALBUMIN SERPL ELPH-MCNC: 4.3 G/DL
ALP BLD-CCNC: 96 U/L
ALT SERPL-CCNC: 10 U/L
ANION GAP SERPL CALC-SCNC: 12 MMOL/L
APTT BLD: 34.9 SEC
AST SERPL-CCNC: 14 U/L
BILIRUB SERPL-MCNC: 0.2 MG/DL
BUN SERPL-MCNC: 9 MG/DL
CALCIUM SERPL-MCNC: 9.6 MG/DL
CHLORIDE SERPL-SCNC: 103 MMOL/L
CHOLEST SERPL-MCNC: 223 MG/DL
CO2 SERPL-SCNC: 26 MMOL/L
CREAT SERPL-MCNC: 0.64 MG/DL
EGFR: 110 ML/MIN/1.73M2
FOLATE SERPL-MCNC: 6.4 NG/ML
GLUCOSE SERPL-MCNC: 101 MG/DL
HCG SERPL QL: NEGATIVE
HDLC SERPL-MCNC: 51 MG/DL
INR PPP: 1.02 RATIO
LDLC SERPL CALC-MCNC: 142 MG/DL
NONHDLC SERPL-MCNC: 172 MG/DL
PAPP-A SERPL-ACNC: <1 MIU/ML
POTASSIUM SERPL-SCNC: 4.4 MMOL/L
PROT SERPL-MCNC: 7.4 G/DL
PT BLD: 11.5 SEC
SODIUM SERPL-SCNC: 142 MMOL/L
TRIGL SERPL-MCNC: 170 MG/DL
TSH SERPL-ACNC: 2.98 UIU/ML
VIT B12 SERPL-MCNC: 645 PG/ML

## 2023-10-16 LAB
ESTIMATED AVERAGE GLUCOSE: 117 MG/DL
HBA1C MFR BLD HPLC: 5.7 %

## 2023-10-17 ENCOUNTER — APPOINTMENT (OUTPATIENT)
Dept: SURGERY | Facility: CLINIC | Age: 48
End: 2023-10-17
Payer: MEDICARE

## 2023-10-17 PROCEDURE — 99213 OFFICE O/P EST LOW 20 MIN: CPT

## 2023-10-23 ENCOUNTER — APPOINTMENT (OUTPATIENT)
Dept: SURGERY | Facility: CLINIC | Age: 48
End: 2023-10-23

## 2023-10-23 VITALS
SYSTOLIC BLOOD PRESSURE: 131 MMHG | HEIGHT: 65 IN | RESPIRATION RATE: 17 BRPM | HEART RATE: 78 BPM | TEMPERATURE: 97.8 F | BODY MASS INDEX: 45.87 KG/M2 | OXYGEN SATURATION: 99 % | WEIGHT: 275.31 LBS | DIASTOLIC BLOOD PRESSURE: 82 MMHG

## 2023-10-24 LAB — VIT B1 SERPL-MCNC: 127.5 NMOL/L

## 2023-11-02 ENCOUNTER — EMERGENCY (EMERGENCY)
Facility: HOSPITAL | Age: 48
LOS: 1 days | Discharge: HOME | End: 2023-11-02
Attending: EMERGENCY MEDICINE | Admitting: EMERGENCY MEDICINE
Payer: MEDICARE

## 2023-11-02 VITALS
SYSTOLIC BLOOD PRESSURE: 124 MMHG | RESPIRATION RATE: 18 BRPM | OXYGEN SATURATION: 100 % | HEART RATE: 72 BPM | TEMPERATURE: 98 F | DIASTOLIC BLOOD PRESSURE: 70 MMHG

## 2023-11-02 VITALS
TEMPERATURE: 98 F | OXYGEN SATURATION: 100 % | RESPIRATION RATE: 18 BRPM | HEART RATE: 86 BPM | SYSTOLIC BLOOD PRESSURE: 155 MMHG | DIASTOLIC BLOOD PRESSURE: 86 MMHG

## 2023-11-02 DIAGNOSIS — Z98.890 OTHER SPECIFIED POSTPROCEDURAL STATES: Chronic | ICD-10-CM

## 2023-11-02 LAB
ALBUMIN SERPL ELPH-MCNC: 4 G/DL — SIGNIFICANT CHANGE UP (ref 3.3–5)
ALBUMIN SERPL ELPH-MCNC: 4 G/DL — SIGNIFICANT CHANGE UP (ref 3.3–5)
ALP SERPL-CCNC: 95 U/L — SIGNIFICANT CHANGE UP (ref 40–120)
ALP SERPL-CCNC: 95 U/L — SIGNIFICANT CHANGE UP (ref 40–120)
ALT FLD-CCNC: 12 U/L — SIGNIFICANT CHANGE UP (ref 4–33)
ALT FLD-CCNC: 12 U/L — SIGNIFICANT CHANGE UP (ref 4–33)
ANION GAP SERPL CALC-SCNC: 10 MMOL/L — SIGNIFICANT CHANGE UP (ref 7–14)
ANION GAP SERPL CALC-SCNC: 10 MMOL/L — SIGNIFICANT CHANGE UP (ref 7–14)
APPEARANCE UR: ABNORMAL
APPEARANCE UR: ABNORMAL
AST SERPL-CCNC: 18 U/L — SIGNIFICANT CHANGE UP (ref 4–32)
AST SERPL-CCNC: 18 U/L — SIGNIFICANT CHANGE UP (ref 4–32)
BACTERIA # UR AUTO: NEGATIVE /HPF — SIGNIFICANT CHANGE UP
BACTERIA # UR AUTO: NEGATIVE /HPF — SIGNIFICANT CHANGE UP
BASOPHILS # BLD AUTO: 0.07 K/UL — SIGNIFICANT CHANGE UP (ref 0–0.2)
BASOPHILS # BLD AUTO: 0.07 K/UL — SIGNIFICANT CHANGE UP (ref 0–0.2)
BASOPHILS NFR BLD AUTO: 0.5 % — SIGNIFICANT CHANGE UP (ref 0–2)
BASOPHILS NFR BLD AUTO: 0.5 % — SIGNIFICANT CHANGE UP (ref 0–2)
BILIRUB SERPL-MCNC: 0.3 MG/DL — SIGNIFICANT CHANGE UP (ref 0.2–1.2)
BILIRUB SERPL-MCNC: 0.3 MG/DL — SIGNIFICANT CHANGE UP (ref 0.2–1.2)
BILIRUB UR-MCNC: NEGATIVE — SIGNIFICANT CHANGE UP
BILIRUB UR-MCNC: NEGATIVE — SIGNIFICANT CHANGE UP
BUN SERPL-MCNC: 12 MG/DL — SIGNIFICANT CHANGE UP (ref 7–23)
BUN SERPL-MCNC: 12 MG/DL — SIGNIFICANT CHANGE UP (ref 7–23)
CALCIUM SERPL-MCNC: 9.5 MG/DL — SIGNIFICANT CHANGE UP (ref 8.4–10.5)
CALCIUM SERPL-MCNC: 9.5 MG/DL — SIGNIFICANT CHANGE UP (ref 8.4–10.5)
CAST: 2 /LPF — SIGNIFICANT CHANGE UP (ref 0–4)
CAST: 2 /LPF — SIGNIFICANT CHANGE UP (ref 0–4)
CHLORIDE SERPL-SCNC: 98 MMOL/L — SIGNIFICANT CHANGE UP (ref 98–107)
CHLORIDE SERPL-SCNC: 98 MMOL/L — SIGNIFICANT CHANGE UP (ref 98–107)
CO2 SERPL-SCNC: 25 MMOL/L — SIGNIFICANT CHANGE UP (ref 22–31)
CO2 SERPL-SCNC: 25 MMOL/L — SIGNIFICANT CHANGE UP (ref 22–31)
COLOR SPEC: YELLOW — SIGNIFICANT CHANGE UP
COLOR SPEC: YELLOW — SIGNIFICANT CHANGE UP
CREAT SERPL-MCNC: 1.05 MG/DL — SIGNIFICANT CHANGE UP (ref 0.5–1.3)
CREAT SERPL-MCNC: 1.05 MG/DL — SIGNIFICANT CHANGE UP (ref 0.5–1.3)
DIFF PNL FLD: ABNORMAL
DIFF PNL FLD: ABNORMAL
EGFR: 66 ML/MIN/1.73M2 — SIGNIFICANT CHANGE UP
EGFR: 66 ML/MIN/1.73M2 — SIGNIFICANT CHANGE UP
EOSINOPHIL # BLD AUTO: 0.12 K/UL — SIGNIFICANT CHANGE UP (ref 0–0.5)
EOSINOPHIL # BLD AUTO: 0.12 K/UL — SIGNIFICANT CHANGE UP (ref 0–0.5)
EOSINOPHIL NFR BLD AUTO: 0.9 % — SIGNIFICANT CHANGE UP (ref 0–6)
EOSINOPHIL NFR BLD AUTO: 0.9 % — SIGNIFICANT CHANGE UP (ref 0–6)
GLUCOSE SERPL-MCNC: 107 MG/DL — HIGH (ref 70–99)
GLUCOSE SERPL-MCNC: 107 MG/DL — HIGH (ref 70–99)
GLUCOSE UR QL: NEGATIVE MG/DL — SIGNIFICANT CHANGE UP
GLUCOSE UR QL: NEGATIVE MG/DL — SIGNIFICANT CHANGE UP
HCG SERPL-ACNC: <1 MIU/ML — SIGNIFICANT CHANGE UP
HCG SERPL-ACNC: <1 MIU/ML — SIGNIFICANT CHANGE UP
HCT VFR BLD CALC: 40.1 % — SIGNIFICANT CHANGE UP (ref 34.5–45)
HCT VFR BLD CALC: 40.1 % — SIGNIFICANT CHANGE UP (ref 34.5–45)
HGB BLD-MCNC: 13 G/DL — SIGNIFICANT CHANGE UP (ref 11.5–15.5)
HGB BLD-MCNC: 13 G/DL — SIGNIFICANT CHANGE UP (ref 11.5–15.5)
IANC: 9.95 K/UL — HIGH (ref 1.8–7.4)
IANC: 9.95 K/UL — HIGH (ref 1.8–7.4)
IMM GRANULOCYTES NFR BLD AUTO: 0.3 % — SIGNIFICANT CHANGE UP (ref 0–0.9)
IMM GRANULOCYTES NFR BLD AUTO: 0.3 % — SIGNIFICANT CHANGE UP (ref 0–0.9)
KETONES UR-MCNC: NEGATIVE MG/DL — SIGNIFICANT CHANGE UP
KETONES UR-MCNC: NEGATIVE MG/DL — SIGNIFICANT CHANGE UP
LEUKOCYTE ESTERASE UR-ACNC: ABNORMAL
LEUKOCYTE ESTERASE UR-ACNC: ABNORMAL
LYMPHOCYTES # BLD AUTO: 16.8 % — SIGNIFICANT CHANGE UP (ref 13–44)
LYMPHOCYTES # BLD AUTO: 16.8 % — SIGNIFICANT CHANGE UP (ref 13–44)
LYMPHOCYTES # BLD AUTO: 2.29 K/UL — SIGNIFICANT CHANGE UP (ref 1–3.3)
LYMPHOCYTES # BLD AUTO: 2.29 K/UL — SIGNIFICANT CHANGE UP (ref 1–3.3)
MCHC RBC-ENTMCNC: 28 PG — SIGNIFICANT CHANGE UP (ref 27–34)
MCHC RBC-ENTMCNC: 28 PG — SIGNIFICANT CHANGE UP (ref 27–34)
MCHC RBC-ENTMCNC: 32.4 GM/DL — SIGNIFICANT CHANGE UP (ref 32–36)
MCHC RBC-ENTMCNC: 32.4 GM/DL — SIGNIFICANT CHANGE UP (ref 32–36)
MCV RBC AUTO: 86.4 FL — SIGNIFICANT CHANGE UP (ref 80–100)
MCV RBC AUTO: 86.4 FL — SIGNIFICANT CHANGE UP (ref 80–100)
MONOCYTES # BLD AUTO: 1.15 K/UL — HIGH (ref 0–0.9)
MONOCYTES # BLD AUTO: 1.15 K/UL — HIGH (ref 0–0.9)
MONOCYTES NFR BLD AUTO: 8.4 % — SIGNIFICANT CHANGE UP (ref 2–14)
MONOCYTES NFR BLD AUTO: 8.4 % — SIGNIFICANT CHANGE UP (ref 2–14)
NEUTROPHILS # BLD AUTO: 9.95 K/UL — HIGH (ref 1.8–7.4)
NEUTROPHILS # BLD AUTO: 9.95 K/UL — HIGH (ref 1.8–7.4)
NEUTROPHILS NFR BLD AUTO: 73.1 % — SIGNIFICANT CHANGE UP (ref 43–77)
NEUTROPHILS NFR BLD AUTO: 73.1 % — SIGNIFICANT CHANGE UP (ref 43–77)
NITRITE UR-MCNC: NEGATIVE — SIGNIFICANT CHANGE UP
NITRITE UR-MCNC: NEGATIVE — SIGNIFICANT CHANGE UP
NRBC # BLD: 0 /100 WBCS — SIGNIFICANT CHANGE UP (ref 0–0)
NRBC # BLD: 0 /100 WBCS — SIGNIFICANT CHANGE UP (ref 0–0)
NRBC # FLD: 0 K/UL — SIGNIFICANT CHANGE UP (ref 0–0)
NRBC # FLD: 0 K/UL — SIGNIFICANT CHANGE UP (ref 0–0)
PH UR: 5.5 — SIGNIFICANT CHANGE UP (ref 5–8)
PH UR: 5.5 — SIGNIFICANT CHANGE UP (ref 5–8)
PLATELET # BLD AUTO: 294 K/UL — SIGNIFICANT CHANGE UP (ref 150–400)
PLATELET # BLD AUTO: 294 K/UL — SIGNIFICANT CHANGE UP (ref 150–400)
POTASSIUM SERPL-MCNC: 3.9 MMOL/L — SIGNIFICANT CHANGE UP (ref 3.5–5.3)
POTASSIUM SERPL-MCNC: 3.9 MMOL/L — SIGNIFICANT CHANGE UP (ref 3.5–5.3)
POTASSIUM SERPL-SCNC: 3.9 MMOL/L — SIGNIFICANT CHANGE UP (ref 3.5–5.3)
POTASSIUM SERPL-SCNC: 3.9 MMOL/L — SIGNIFICANT CHANGE UP (ref 3.5–5.3)
PROT SERPL-MCNC: 7.4 G/DL — SIGNIFICANT CHANGE UP (ref 6–8.3)
PROT SERPL-MCNC: 7.4 G/DL — SIGNIFICANT CHANGE UP (ref 6–8.3)
PROT UR-MCNC: 30 MG/DL
PROT UR-MCNC: 30 MG/DL
RBC # BLD: 4.64 M/UL — SIGNIFICANT CHANGE UP (ref 3.8–5.2)
RBC # BLD: 4.64 M/UL — SIGNIFICANT CHANGE UP (ref 3.8–5.2)
RBC # FLD: 12.4 % — SIGNIFICANT CHANGE UP (ref 10.3–14.5)
RBC # FLD: 12.4 % — SIGNIFICANT CHANGE UP (ref 10.3–14.5)
RBC CASTS # UR COMP ASSIST: 9 /HPF — HIGH (ref 0–4)
RBC CASTS # UR COMP ASSIST: 9 /HPF — HIGH (ref 0–4)
SODIUM SERPL-SCNC: 133 MMOL/L — LOW (ref 135–145)
SODIUM SERPL-SCNC: 133 MMOL/L — LOW (ref 135–145)
SP GR SPEC: 1.03 — SIGNIFICANT CHANGE UP (ref 1–1.03)
SP GR SPEC: 1.03 — SIGNIFICANT CHANGE UP (ref 1–1.03)
SQUAMOUS # UR AUTO: 18 /HPF — HIGH (ref 0–5)
SQUAMOUS # UR AUTO: 18 /HPF — HIGH (ref 0–5)
UROBILINOGEN FLD QL: 0.2 MG/DL — SIGNIFICANT CHANGE UP (ref 0.2–1)
UROBILINOGEN FLD QL: 0.2 MG/DL — SIGNIFICANT CHANGE UP (ref 0.2–1)
WBC # BLD: 13.62 K/UL — HIGH (ref 3.8–10.5)
WBC # BLD: 13.62 K/UL — HIGH (ref 3.8–10.5)
WBC # FLD AUTO: 13.62 K/UL — HIGH (ref 3.8–10.5)
WBC # FLD AUTO: 13.62 K/UL — HIGH (ref 3.8–10.5)
WBC UR QL: 650 /HPF — HIGH (ref 0–5)
WBC UR QL: 650 /HPF — HIGH (ref 0–5)

## 2023-11-02 PROCEDURE — 99053 MED SERV 10PM-8AM 24 HR FAC: CPT

## 2023-11-02 PROCEDURE — 99285 EMERGENCY DEPT VISIT HI MDM: CPT

## 2023-11-02 PROCEDURE — 74177 CT ABD & PELVIS W/CONTRAST: CPT | Mod: 26,MA

## 2023-11-02 RX ORDER — SODIUM CHLORIDE 9 MG/ML
1000 INJECTION INTRAMUSCULAR; INTRAVENOUS; SUBCUTANEOUS ONCE
Refills: 0 | Status: COMPLETED | OUTPATIENT
Start: 2023-11-02 | End: 2023-11-02

## 2023-11-02 RX ORDER — KETOROLAC TROMETHAMINE 30 MG/ML
30 SYRINGE (ML) INJECTION ONCE
Refills: 0 | Status: DISCONTINUED | OUTPATIENT
Start: 2023-11-02 | End: 2023-11-02

## 2023-11-02 RX ORDER — OXYCODONE HYDROCHLORIDE 5 MG/1
1 TABLET ORAL
Qty: 12 | Refills: 0
Start: 2023-11-02 | End: 2023-11-04

## 2023-11-02 RX ORDER — ACETAMINOPHEN 500 MG
1000 TABLET ORAL ONCE
Refills: 0 | Status: COMPLETED | OUTPATIENT
Start: 2023-11-02 | End: 2023-11-02

## 2023-11-02 RX ORDER — TAMSULOSIN HYDROCHLORIDE 0.4 MG/1
1 CAPSULE ORAL
Qty: 30 | Refills: 0
Start: 2023-11-02 | End: 2023-12-01

## 2023-11-02 RX ORDER — ONDANSETRON 8 MG/1
4 TABLET, FILM COATED ORAL ONCE
Refills: 0 | Status: COMPLETED | OUTPATIENT
Start: 2023-11-02 | End: 2023-11-02

## 2023-11-02 RX ORDER — ONDANSETRON 8 MG/1
1 TABLET, FILM COATED ORAL
Qty: 12 | Refills: 0
Start: 2023-11-02 | End: 2023-11-05

## 2023-11-02 RX ORDER — ACETAMINOPHEN 500 MG
2 TABLET ORAL
Qty: 56 | Refills: 0
Start: 2023-11-02 | End: 2023-11-08

## 2023-11-02 RX ORDER — CEFTRIAXONE 500 MG/1
1000 INJECTION, POWDER, FOR SOLUTION INTRAMUSCULAR; INTRAVENOUS ONCE
Refills: 0 | Status: COMPLETED | OUTPATIENT
Start: 2023-11-02 | End: 2023-11-02

## 2023-11-02 RX ORDER — MORPHINE SULFATE 50 MG/1
4 CAPSULE, EXTENDED RELEASE ORAL ONCE
Refills: 0 | Status: DISCONTINUED | OUTPATIENT
Start: 2023-11-02 | End: 2023-11-02

## 2023-11-02 RX ADMIN — MORPHINE SULFATE 4 MILLIGRAM(S): 50 CAPSULE, EXTENDED RELEASE ORAL at 07:01

## 2023-11-02 RX ADMIN — CEFTRIAXONE 100 MILLIGRAM(S): 500 INJECTION, POWDER, FOR SOLUTION INTRAMUSCULAR; INTRAVENOUS at 06:01

## 2023-11-02 RX ADMIN — SODIUM CHLORIDE 1000 MILLILITER(S): 9 INJECTION INTRAMUSCULAR; INTRAVENOUS; SUBCUTANEOUS at 09:26

## 2023-11-02 RX ADMIN — Medication 400 MILLIGRAM(S): at 03:26

## 2023-11-02 RX ADMIN — MORPHINE SULFATE 4 MILLIGRAM(S): 50 CAPSULE, EXTENDED RELEASE ORAL at 06:02

## 2023-11-02 RX ADMIN — SODIUM CHLORIDE 1000 MILLILITER(S): 9 INJECTION INTRAMUSCULAR; INTRAVENOUS; SUBCUTANEOUS at 07:07

## 2023-11-02 RX ADMIN — Medication 1000 MILLIGRAM(S): at 07:01

## 2023-11-02 RX ADMIN — CEFTRIAXONE 1000 MILLIGRAM(S): 500 INJECTION, POWDER, FOR SOLUTION INTRAMUSCULAR; INTRAVENOUS at 07:01

## 2023-11-02 RX ADMIN — Medication 1000 MILLIGRAM(S): at 07:00

## 2023-11-02 RX ADMIN — ONDANSETRON 4 MILLIGRAM(S): 8 TABLET, FILM COATED ORAL at 03:26

## 2023-11-02 NOTE — ED PROVIDER NOTE - NS ED ATTENDING STATEMENT MOD
This was a shared visit with the ROSITA. I reviewed and verified the documentation and independently performed the documented:

## 2023-11-02 NOTE — ED ADULT NURSE NOTE - AS PAIN REST

## 2023-11-02 NOTE — ED ADULT NURSE NOTE - OBJECTIVE STATEMENT
Break RN: Pt is a 46 y/o Female, A&Ox4, ambulatory with no reported pertinent medical hx, presenting to the ED with c/o left flank pain x 1 day. Pt states she went to urgent care and was diagnosed with a UTI. Pt states she took firs 2 doses of antibiotic but could not tolerate the pain. Pt appears uncomfortable and reports pain radiates down left side of back. Pt also endorses nausea. Neuro/sensory intact. Respirations even and unlabored, chest rise equal b/l. Abdomen soft, nondistended, nontender. VS as noted in flow sheets. Pt denies chest pain, SOB, fever, cough, chills, abdominal pain, V/D, h/a, dizziness, numbness/tingling, hematuria, dysuria, or foul odor from urine at this time. No acute distress noted. 20g IVL placed in RAC. Labs collected and sent. Safety maintained throughout. Will continue to monitor.

## 2023-11-02 NOTE — CONSULT NOTE ADULT - SUBJECTIVE AND OBJECTIVE BOX
HPI   47-year-old female with no significant past medical history, past surgical history gastric sleeve (), presenting with complaint of left-sided flank pain radiating to lower abdomen times today.  Patient states that she was evaluated by PCP, prescribed antibiotics to treat UTI.  Patient states that pain has progressively worsened prompting ED evaluation.  No associated fever, chills, vomiting, hematuria, dysuria.    Urology was consulted due to 5mm left UVJ stone. Patient reports left flank pain starting yesterday with nausea and vomiting. Denies any dysuria, increased frequency, or gross hematuria or fevers at home. Saw her pcp who prescribed her an abx for a positive urine dipstick in the office. This AM, she reports she has had no pain since 3am. afebrile    PAST MEDICAL & SURGICAL HISTORY:  Morbid Obesity  BMI 64.2      metorrhagia/Bleeding  s/p D&C      personal h/o Iron Deficiency Anemia      Vertigo  last episode       personal h/o Fatty Liver documented on testing      H/O cholelithiasis      H/O Helicobacter infection   and treated      H/O trichomoniasis  treated 10/2022      NICOLÁS (obstructive sleep apnea)  moderate - no CPAP      COVID-19 virus infection  2021 fever, hedaches and runny nose      HTN (hypertension)      Fatty liver      History of D&C        S/P endoscopy  10/4/22 - to assess esophageal varices - no treatment at this time - stable          MEDICATIONS  (STANDING):  oxycodone    5 mG/acetaminophen 325 mG 1 Tablet(s) Oral Once  sodium chloride 0.9% Bolus 1000 milliLiter(s) IV Bolus once    MEDICATIONS  (PRN):      FAMILY HISTORY:  No pertinent family history in first degree relatives        Allergies    codeine (Urticaria)  Dramamine (Unknown)  [This allergen will not trigger allergy alert] COLD urticaria - has epi pen (Rash; Urticaria)    Intolerances        SOCIAL HISTORY:    REVIEW OF SYSTEMS: Otherwise negative as stated in HPI    Physical Exam  Vital signs  T(C): 36.7 (23 @ 07:09), Max: 36.9 (23 @ 02:05)  HR: 71 (23 @ 07:09)  BP: 138/94 (23 @ 07:09)  SpO2: 99% (23 @ 07:09)  Wt(kg): --    Output      Gen: NAD  Pulm: No respiratory distress	  CV: RRR  GI: S/ND/NT  :   MSK: moves all 4 limbs spontaneously    LABS:       @ 02:40    WBC 13.62 / Hct 40.1  / SCr 1.05         133<L>  |  98  |  12  ----------------------------<  107<H>  3.9   |  25  |  1.05    Ca    9.5      2023 02:40    TPro  7.4  /  Alb  4.0  /  TBili  0.3  /  DBili  x   /  AST  18  /  ALT  12  /  AlkPhos  95        Urinalysis Basic - ( 2023 02:40 )    Color: Yellow / Appearance: Turbid / S.027 / pH: x  Gluc: 107 mg/dL / Ketone: Negative mg/dL  / Bili: Negative / Urobili: 0.2 mg/dL   Blood: x / Protein: 30 mg/dL / Nitrite: Negative   Leuk Esterase: Moderate / RBC: 9 /HPF /  /HPF   Sq Epi: x / Non Sq Epi: 18 /HPF / Bacteria: Negative /HPF            Urine Cx:    Blood Cx:    RADIOLOGY:     HPI   47-year-old female with no significant past medical history, past surgical history gastric sleeve (), presenting with complaint of left-sided flank pain radiating to lower abdomen times today.  Patient states that she was evaluated by PCP, prescribed antibiotics to treat UTI.  Patient states that pain has progressively worsened prompting ED evaluation.  No associated fever, chills, vomiting, hematuria, dysuria.    Urology was consulted due to 5mm left UVJ stone. Patient reports left flank pain starting yesterday with nausea and vomiting. Denies any dysuria, increased frequency, or gross hematuria or fevers at home. Saw her pcp who prescribed her an abx for a positive urine dipstick in the office. This AM, she reports she has had no pain since 3am. afebrile. WBC 13.6. Cr 1.06. UA Mod leuks, blood, WBC, negative for bacteria in the setting of recent ABx.     PAST MEDICAL & SURGICAL HISTORY:  Morbid Obesity  BMI 64.2      metorrhagia/Bleeding  s/p D&C      personal h/o Iron Deficiency Anemia      Vertigo  last episode       personal h/o Fatty Liver documented on testing      H/O cholelithiasis      H/O Helicobacter infection  2018 and treated      H/O trichomoniasis  treated 10/2022      NICOLÁS (obstructive sleep apnea)  moderate - no CPAP      COVID-19 virus infection  2021 fever, hedaches and runny nose      HTN (hypertension)      Fatty liver      History of D&C        S/P endoscopy  10/4/22 - to assess esophageal varices - no treatment at this time - stable          MEDICATIONS  (STANDING):  oxycodone    5 mG/acetaminophen 325 mG 1 Tablet(s) Oral Once  sodium chloride 0.9% Bolus 1000 milliLiter(s) IV Bolus once    MEDICATIONS  (PRN):      FAMILY HISTORY:  No pertinent family history in first degree relatives        Allergies    codeine (Urticaria)  Dramamine (Unknown)  [This allergen will not trigger allergy alert] COLD urticaria - has epi pen (Rash; Urticaria)    Intolerances        SOCIAL HISTORY:    REVIEW OF SYSTEMS: Otherwise negative as stated in HPI    Physical Exam  Vital signs  T(C): 36.7 (23 @ 07:09), Max: 36.9 (23 @ 02:05)  HR: 71 (23 @ 07:09)  BP: 138/94 (23 @ 07:09)  SpO2: 99% (23 @ 07:09)  Wt(kg): --    Output      Gen: NAD  Pulm: No respiratory distress	  CV: RRR  GI: S/ND/NT  : No CVA tenderness  MSK: moves all 4 limbs spontaneously    LABS:       @ 02:40    WBC 13.62 / Hct 40.1  / SCr 1.05         133<L>  |  98  |  12  ----------------------------<  107<H>  3.9   |  25  |  1.05    Ca    9.5      2023 02:40    TPro  7.4  /  Alb  4.0  /  TBili  0.3  /  DBili  x   /  AST  18  /  ALT  12  /  AlkPhos  95        Urinalysis Basic - ( 2023 02:40 )    Color: Yellow / Appearance: Turbid / S.027 / pH: x  Gluc: 107 mg/dL / Ketone: Negative mg/dL  / Bili: Negative / Urobili: 0.2 mg/dL   Blood: x / Protein: 30 mg/dL / Nitrite: Negative   Leuk Esterase: Moderate / RBC: 9 /HPF /  /HPF   Sq Epi: x / Non Sq Epi: 18 /HPF / Bacteria: Negative /HPF          RADIOLOGY:    < from: CT Abdomen and Pelvis w/ Oral Cont and w/ IV Cont (23 @ 05:29) >    IMPRESSION:    1. 5.5 mm obstructing stone in the left UVJ.  2. Indeterminate splenic lesion, now demonstrates central calcification.   Nonemergent/outpatient MRI evaluation suggested.    < end of copied text >

## 2023-11-02 NOTE — ED PROVIDER NOTE - PROGRESS NOTE DETAILS
Patient seen by urology no acute urologic interventions needed at this time.  Recommended course of antibiotics, pain medication, antiemetics and follow-up with outpatient urologist with return precautions.  Patient's pain is well controlled at this time she is tolerating p.o.  Patient amenable to plan for discharge.  Patient provided thermometer to monitor temperature at home.  Patient told strict return precautions of fever, severe pain, inability to tolerate p.o.  Patient understands.

## 2023-11-02 NOTE — ED PROVIDER NOTE - CLINICAL SUMMARY MEDICAL DECISION MAKING FREE TEXT BOX
patient is a 47-year-old female with no significant past medical history, past surgical history gastric sleeve (2023), presenting with complaint of left-sided flank pain radiating to lower abdomen times today.  Patient states that she was evaluated by PCP, prescribed antibiotics to treat UTI. On presentation patient appears uncomfortable, tenderness to the left flank appreciated.  Symptoms possibly due to pyelonephritis, renal stone, renal abscess.  Plan of care routine labs, analgesics, CT abdomen pelvis.

## 2023-11-02 NOTE — ED PROVIDER NOTE - PATIENT PORTAL LINK FT
You can access the FollowMyHealth Patient Portal offered by [facility] by registering at the following website: [email or web address]. By joining [employer]’s FollowMyHealth portal, you will also be able to view your health information using other applications (apps) compatible with our system.

## 2023-11-02 NOTE — ED PROVIDER NOTE - ATTENDING APP SHARED VISIT CONTRIBUTION OF CARE
I performed a face-to-face evaluation of the patient and performed a history and physical examination along with the resident or ACP, and/or medical student above.  I agree with the history and physical examination as documented by the resident or ACP, and/or medical student above.  Banks:  · CONSTITUTIONAL: Well appearing, awake, alert, oriented to person, place, time/situation and in no apparent distress.  · CARDIAC: Normal rate, regular rhythm.  Heart sounds S1, S2.  No murmurs, rubs or gallops.  · RESPIRATORY: Breath sounds clear and equal bilaterally.  · GASTROINTESTINAL: + tenderness to left flank and periumbilical tenderness. Abdomen soft, non-tender, no guarding.  · MUSCULOSKELETAL: Spine appears normal, range of motion is not limited, no muscle or joint tenderness

## 2023-11-02 NOTE — CONSULT NOTE ADULT - ASSESSMENT
47F hx of gastric sleeve (2023) presenting with left flank pain. Urology was consulted due to left 5mm UVJ stone. Pain currently well controlled. UA +leuks, blood, WBC and negative for bacteria in the setting of recent abx use. Afebrile at home.     Plan  - No acute urologic intervention  - please send with 3 days of bactrim   - Trial of medical expulsive therapy with PO pain control  -Flomax 0.4mg qHS x30 days  -oxycodone PRN pain  -Zofran PRN nausea  -Senna, colace, miralax  -Aggressive hydration  -Strain urine for calculi  -Return to ER for any fever > 100.4, pain uncontrolled on discharge pain medications, or inability to tolerate PO  -Patient to follow-up in 1-2 weeks with urology    [facility]  [address] [place], [place] [address] [phone]

## 2023-11-02 NOTE — ED ADULT NURSE NOTE - NSFALLUNIVINTERV_ED_ALL_ED
Bed/Stretcher in lowest position, wheels locked, appropriate side rails in place/Call bell, personal items and telephone in reach/Instruct patient to call for assistance before getting out of bed/chair/stretcher/Non-slip footwear applied when patient is off stretcher/[place] to call system/Physically safe environment - no spills, clutter or unnecessary equipment/Purposeful proactive rounding/Room/bathroom lighting operational, light cord in reach

## 2023-11-02 NOTE — ED ADULT NURSE REASSESSMENT NOTE - NS ED NURSE REASSESS COMMENT FT1
pt resting comfortably in bed. pt A&Ox4. vitals as noted in flow sheet. pt denies pain , chest pain , headache , palpitations and blurry vision. safety maintained.

## 2023-11-02 NOTE — ED PROVIDER NOTE - OBJECTIVE STATEMENT
patient is a 47-year-old female with no significant past medical history, past surgical history gastric sleeve (2023), presenting with complaint of left-sided flank pain radiating to lower abdomen times today.  Patient states that she was evaluated by PCP, prescribed antibiotics to treat UTI.  Patient states that pain has progressively worsened prompting ED evaluation.  No associated fever, chills, vomiting, hematuria, dysuria.

## 2023-11-02 NOTE — ED PROVIDER NOTE - NSFOLLOWUPINSTRUCTIONS_ED_ALL_ED_FT
Follow up with your PMD within 48-72 hrs.     Follow up with Urology within 1-2 weeks   [facility]  [address] [place] [address] [phone]      Take Bactrim 1 tablet twice a day for 5 days.    Take Flomax 0.4mg daily,     Take Tylenol 500mg (Can take 1-2 tablets) 6-8 hours as needed for pain. Never exceeding 4000mg a day.      Take Oxycodone 5mg 1 tab every 6 hrs for breakthru pain- caution drowsiness (do not drive or operate heavy machinery).      Take Zofran 4mg dissolvable tablet every 8 hours as needed for nausea/ vomiting.     Increase your fluid intake and continue to strain your urine.   Any worsening pain, fever  > 100.4, chills, difficulty urinating, vomiting return to ED

## 2023-11-03 LAB
CULTURE RESULTS: SIGNIFICANT CHANGE UP
CULTURE RESULTS: SIGNIFICANT CHANGE UP
SPECIMEN SOURCE: SIGNIFICANT CHANGE UP
SPECIMEN SOURCE: SIGNIFICANT CHANGE UP

## 2023-11-08 ENCOUNTER — APPOINTMENT (OUTPATIENT)
Dept: UROLOGY | Facility: CLINIC | Age: 48
End: 2023-11-08
Payer: MEDICARE

## 2023-11-08 VITALS
BODY MASS INDEX: 46.15 KG/M2 | HEART RATE: 89 BPM | WEIGHT: 277 LBS | SYSTOLIC BLOOD PRESSURE: 137 MMHG | HEIGHT: 65 IN | DIASTOLIC BLOOD PRESSURE: 85 MMHG

## 2023-11-08 DIAGNOSIS — N20.1 CALCULUS OF URETER: ICD-10-CM

## 2023-11-08 PROCEDURE — 99203 OFFICE O/P NEW LOW 30 MIN: CPT

## 2023-11-10 ENCOUNTER — NON-APPOINTMENT (OUTPATIENT)
Age: 48
End: 2023-11-10

## 2023-11-10 NOTE — H&P PST ADULT - NS PRO TALK SOMEONE YN
Chase Cordero  1966  15-year-old male currently at [address] with decompensated autoimmune hepatitis now cirrhotic versus alcohol cirrhosis versus a combination those and significant ascites. He has now had 2 large-volume paracenteses, the most recent with over 16 L of fluid removed (total of 35L between the two). Ca 8.1, ALT 4, Albumin 2.5, Total Bili 1.84, RBC 2.79, Hgb 9.2, Hct 29.8, Ammonia 73, INR 1.54. Pt does not want any treatment, but is also not competent to make decisions at this time. Considering [facility] before discharge. Pt family wants pt home on hospice and to focus on comfort. Can we approve RLOC? no

## 2023-11-11 LAB
APPEARANCE: CLEAR
BACTERIA UR CULT: NORMAL
BACTERIA: ABNORMAL /HPF
BILIRUBIN URINE: NEGATIVE
BLOOD URINE: ABNORMAL
CALCIUM OXALATE CRYSTALS: PRESENT
CAST: 1 /LPF
COLOR: YELLOW
EPITHELIAL CELLS: 5 /HPF
GLUCOSE QUALITATIVE U: NEGATIVE MG/DL
KETONES URINE: NEGATIVE MG/DL
LEUKOCYTE ESTERASE URINE: ABNORMAL
MICROSCOPIC-UA: NORMAL
NITRITE URINE: NEGATIVE
PH URINE: 5.5
PROTEIN URINE: NORMAL MG/DL
RED BLOOD CELLS URINE: 4 /HPF
REVIEW: NORMAL
SPECIFIC GRAVITY URINE: >1.03
UROBILINOGEN URINE: 0.2 MG/DL
WHITE BLOOD CELLS URINE: 106 /HPF

## 2023-11-20 ENCOUNTER — APPOINTMENT (OUTPATIENT)
Dept: SURGERY | Facility: CLINIC | Age: 48
End: 2023-11-20

## 2023-12-08 ENCOUNTER — APPOINTMENT (OUTPATIENT)
Dept: BARIATRICS | Facility: CLINIC | Age: 48
End: 2023-12-08

## 2024-01-01 NOTE — DISCHARGE NOTE NURSING/CASE MANAGEMENT/SOCIAL WORK - NSDCPELOVENOXREACT_GEN_ALL_CORE
active active with stimulation Enoxaparin/Lovenox increases your risk for bleeding. Notify your doctor if you experience any of the following side effects: unusual bleeding or bruising, coughing up or vomiting blood, red or black stool, blood in your urine, itching or hives, chest tightness, chest pain, shortness of breath, trouble breathing, swelling in your face or hands, swelling in your mouth or throat, fever, large flat blue or purplish patches on the skin, numbness or weakness in your arm or leg on one side, pain in your lower leg, sudden or severe headache with vision, speech or walking problems. When Enoxaparin/Lovenox is taken with other medicines, they can affect how it works. Taking other medications such as aspirin, blood thinners, and nonsteroidal anti-inflammatories increases your risk of bleeding. It is very important to tell your health care provider about all of the other medicines, including over-the-counter medications, herbs, and vitamins you are taking. DO NOT start, stop, or change the dosage of any medicine, including over-the-counter medicines, vitamins, and herbal products without your doctor’s approval. Any products containing aspirin or are nonsteroidal anti-inflammatories lessen the blood’s ability to form clots and adds to the effect of Enoxaparin/Lovenox. Never take aspirin or medicines that contain aspirin without speaking to your doctor.

## 2024-01-04 ENCOUNTER — APPOINTMENT (OUTPATIENT)
Dept: CT IMAGING | Facility: IMAGING CENTER | Age: 49
End: 2024-01-04

## 2024-01-09 ENCOUNTER — APPOINTMENT (OUTPATIENT)
Dept: OBGYN | Facility: CLINIC | Age: 49
End: 2024-01-09
Payer: MEDICARE

## 2024-01-09 VITALS
SYSTOLIC BLOOD PRESSURE: 141 MMHG | DIASTOLIC BLOOD PRESSURE: 86 MMHG | WEIGHT: 287 LBS | BODY MASS INDEX: 47.82 KG/M2 | HEART RATE: 90 BPM | HEIGHT: 65 IN

## 2024-01-09 DIAGNOSIS — E66.01 MORBID (SEVERE) OBESITY DUE TO EXCESS CALORIES: ICD-10-CM

## 2024-01-09 DIAGNOSIS — Z01.419 ENCOUNTER FOR GYNECOLOGICAL EXAMINATION (GENERAL) (ROUTINE) W/OUT ABNORMAL FINDINGS: ICD-10-CM

## 2024-01-09 DIAGNOSIS — N91.5 OLIGOMENORRHEA, UNSPECIFIED: ICD-10-CM

## 2024-01-09 PROCEDURE — 99396 PREV VISIT EST AGE 40-64: CPT

## 2024-01-09 NOTE — HISTORY OF PRESENT ILLNESS
[FreeTextEntry1] : 48 y.o. P  5   presents for  annual exam,. pt has hx of oligomenorrhea/ secondary amenorrhea,  with morbid obesity.   PSHx - gastric sleeve  1/17/23,   lap darshan 1/17/23,  FH- negative, BRCA- negative, SH - negative,  GYN hx - amenorrhea, ( secondary)  OB hx - NSVDx 5.   pt is due for pap  , mammo/breast sono and pelvic sono pt was 384lbs. pre-sleeve  today pt weighs 287 lbs.

## 2024-01-09 NOTE — PHYSICAL EXAM
[Appropriately responsive] : appropriately responsive [Alert] : alert [No Acute Distress] : no acute distress [No Lymphadenopathy] : no lymphadenopathy [Regular Rate Rhythm] : regular rate rhythm [No Murmurs] : no murmurs [Clear to Auscultation B/L] : clear to auscultation bilaterally [Soft] : soft [Non-tender] : non-tender [Non-distended] : non-distended [No HSM] : No HSM [No Lesions] : no lesions [No Mass] : no mass [Oriented x3] : oriented x3 [Examination Of The Breasts] : a normal appearance [No Masses] : no breast masses were palpable [Labia Majora] : normal [Labia Minora] : normal [Normal] : normal [Uterine Adnexae] : non-palpable

## 2024-01-09 NOTE — DISCUSSION/SUMMARY
[FreeTextEntry1] : A - WWV      morbid obesity       secondary amenorrhea  P- FSH, LH, TSH, PRL      pap done     referral for TV sono for endometrial thickness     referrals for mammo , and breast sono given    referral for baseline colorectal screening given .

## 2024-01-10 LAB
FSH SERPL-MCNC: 30.6 IU/L
LH SERPL-ACNC: 20.4 IU/L
PROLACTIN SERPL-MCNC: 7.1 NG/ML
TSH SERPL-ACNC: 3.28 UIU/ML

## 2024-01-11 LAB — HPV HIGH+LOW RISK DNA PNL CVX: NOT DETECTED

## 2024-01-12 ENCOUNTER — APPOINTMENT (OUTPATIENT)
Dept: BARIATRICS | Facility: CLINIC | Age: 49
End: 2024-01-12
Payer: MEDICARE

## 2024-01-12 VITALS
OXYGEN SATURATION: 97 % | TEMPERATURE: 97.6 F | RESPIRATION RATE: 16 BRPM | HEIGHT: 64 IN | DIASTOLIC BLOOD PRESSURE: 79 MMHG | SYSTOLIC BLOOD PRESSURE: 129 MMHG | HEART RATE: 84 BPM | WEIGHT: 286.4 LBS | BODY MASS INDEX: 48.9 KG/M2

## 2024-01-12 PROCEDURE — 97803 MED NUTRITION INDIV SUBSEQ: CPT

## 2024-01-16 LAB — CYTOLOGY CVX/VAG DOC THIN PREP: NORMAL

## 2024-01-19 ENCOUNTER — APPOINTMENT (OUTPATIENT)
Dept: OBGYN | Facility: CLINIC | Age: 49
End: 2024-01-19
Payer: MEDICARE

## 2024-01-19 ENCOUNTER — ASOB RESULT (OUTPATIENT)
Age: 49
End: 2024-01-19

## 2024-01-19 VITALS
WEIGHT: 285 LBS | BODY MASS INDEX: 48.65 KG/M2 | HEIGHT: 64 IN | DIASTOLIC BLOOD PRESSURE: 87 MMHG | HEART RATE: 82 BPM | SYSTOLIC BLOOD PRESSURE: 125 MMHG

## 2024-01-19 DIAGNOSIS — N76.0 ACUTE VAGINITIS: ICD-10-CM

## 2024-01-19 PROCEDURE — 76830 TRANSVAGINAL US NON-OB: CPT

## 2024-01-19 PROCEDURE — 99213 OFFICE O/P EST LOW 20 MIN: CPT

## 2024-01-20 NOTE — PLAN
[FreeTextEntry1] : 48 year old female presents today for follow up visit.   oligomenorrhea likely menopause  vaginal discharge. h/o BV and Trich Affirm sent

## 2024-01-20 NOTE — SIGNATURES
[TextEntry] : This note was written by Tasha Fine on 01/19/2024 actively solely DANYELL Whiting M.D 01/19/2024. All medical record entries made by this scribe were at my  DANYELL Whiting M.D  direction and personally dictated by me on 01/19/2024. I have personally reviewed my chart and agree that the record reflects my personal performance of the history, physical exam, assessment, and plan.

## 2024-01-20 NOTE — HISTORY OF PRESENT ILLNESS
[FreeTextEntry1] : 48 year old female presents today for follow up visit. Pt wants to confirm if bloodwork results indicate menopause and if she needs to be reswabed for BV panel since she was +BV and trich back in April. Pt advised she is in menopause. Pt advised about US.

## 2024-01-25 LAB
CANDIDA VAG CYTO: NOT DETECTED
G VAGINALIS+PREV SP MTYP VAG QL MICRO: NOT DETECTED
T VAGINALIS VAG QL WET PREP: NOT DETECTED

## 2024-02-01 ENCOUNTER — OUTPATIENT (OUTPATIENT)
Dept: OUTPATIENT SERVICES | Facility: HOSPITAL | Age: 49
LOS: 1 days | End: 2024-02-01
Payer: MEDICARE

## 2024-02-01 ENCOUNTER — APPOINTMENT (OUTPATIENT)
Dept: ULTRASOUND IMAGING | Facility: IMAGING CENTER | Age: 49
End: 2024-02-01
Payer: MEDICARE

## 2024-02-01 ENCOUNTER — RESULT REVIEW (OUTPATIENT)
Age: 49
End: 2024-02-01

## 2024-02-01 ENCOUNTER — APPOINTMENT (OUTPATIENT)
Dept: CT IMAGING | Facility: IMAGING CENTER | Age: 49
End: 2024-02-01
Payer: MEDICARE

## 2024-02-01 ENCOUNTER — APPOINTMENT (OUTPATIENT)
Dept: MAMMOGRAPHY | Facility: IMAGING CENTER | Age: 49
End: 2024-02-01
Payer: MEDICARE

## 2024-02-01 DIAGNOSIS — N91.5 OLIGOMENORRHEA, UNSPECIFIED: ICD-10-CM

## 2024-02-01 DIAGNOSIS — Z98.890 OTHER SPECIFIED POSTPROCEDURAL STATES: Chronic | ICD-10-CM

## 2024-02-01 PROCEDURE — 77063 BREAST TOMOSYNTHESIS BI: CPT | Mod: 26

## 2024-02-01 PROCEDURE — 76641 ULTRASOUND BREAST COMPLETE: CPT

## 2024-02-01 PROCEDURE — 77067 SCR MAMMO BI INCL CAD: CPT | Mod: 26

## 2024-02-01 PROCEDURE — 77063 BREAST TOMOSYNTHESIS BI: CPT

## 2024-02-01 PROCEDURE — 76641 ULTRASOUND BREAST COMPLETE: CPT | Mod: 26,50

## 2024-02-01 PROCEDURE — 77067 SCR MAMMO BI INCL CAD: CPT

## 2024-02-12 ENCOUNTER — APPOINTMENT (OUTPATIENT)
Dept: SURGERY | Facility: CLINIC | Age: 49
End: 2024-02-12
Payer: MEDICARE

## 2024-02-12 VITALS
RESPIRATION RATE: 16 BRPM | BODY MASS INDEX: 47.53 KG/M2 | HEART RATE: 71 BPM | HEIGHT: 65 IN | WEIGHT: 285.31 LBS | OXYGEN SATURATION: 99 % | SYSTOLIC BLOOD PRESSURE: 125 MMHG | TEMPERATURE: 96.9 F | DIASTOLIC BLOOD PRESSURE: 81 MMHG

## 2024-02-12 DIAGNOSIS — E44.1 MILD PROTEIN-CALORIE MALNUTRITION: ICD-10-CM

## 2024-02-12 PROCEDURE — 99213 OFFICE O/P EST LOW 20 MIN: CPT

## 2024-02-12 NOTE — HISTORY OF PRESENT ILLNESS
[de-identified] : RAFAEL is a 48 year old female here for 6-month follow up visit. s/p Laparoscopic sleeve gastrectomy on 01/17/23. She continues to do well overall however has hit a plateau in her weight.  She has been at 285 pounds for some time.  She states she is eating healthfully, does not have large portions, and is exercising regularly.  She is now going to the gym 5 days a week, using the treadmill and walking on an incline, as well as weight training and using resistance bands.  She feels much better, feels she tolerates walking and other cardiovascular activity much better, however is a bit frustrated at the lack of weight loss.  She reports no abdominal pain, no reflux, and no dysphagia.

## 2024-02-12 NOTE — ASSESSMENT
[FreeTextEntry1] : 48-year-old female status post laparoscopic sleeve gastrectomy and cholecystectomy on 1/17/2023.  Preoperative weight 379 pounds, BMI 63 Current weight 285 pounds, BMI 47

## 2024-02-12 NOTE — PHYSICAL EXAM
[Obese, well nourished, in no acute distress] : obese, well nourished, in no acute distress [Normal] : affect appropriate [de-identified] : Normal respirations [de-identified] : Soft, nontender, nondistended.  Incisions well-healed.

## 2024-02-12 NOTE — PLAN
[FreeTextEntry1] : [] labs ordered [] cont bariatric diet and consult with nutritionist [] Discussed "pouch reset diet" [] continue 30 min cardiovascular exercise daily, with some weight resistance training as tolerated [] continue multivitamins [] f/u 3 months

## 2024-03-01 ENCOUNTER — EMERGENCY (EMERGENCY)
Facility: HOSPITAL | Age: 49
LOS: 1 days | Discharge: HOME | End: 2024-03-01
Attending: STUDENT IN AN ORGANIZED HEALTH CARE EDUCATION/TRAINING PROGRAM | Admitting: STUDENT IN AN ORGANIZED HEALTH CARE EDUCATION/TRAINING PROGRAM
Payer: MEDICARE

## 2024-03-01 VITALS
DIASTOLIC BLOOD PRESSURE: 72 MMHG | RESPIRATION RATE: 18 BRPM | SYSTOLIC BLOOD PRESSURE: 122 MMHG | HEART RATE: 78 BPM | TEMPERATURE: 98 F | OXYGEN SATURATION: 99 %

## 2024-03-01 VITALS
RESPIRATION RATE: 16 BRPM | DIASTOLIC BLOOD PRESSURE: 76 MMHG | TEMPERATURE: 99 F | HEART RATE: 84 BPM | SYSTOLIC BLOOD PRESSURE: 126 MMHG | OXYGEN SATURATION: 96 %

## 2024-03-01 DIAGNOSIS — Z98.890 OTHER SPECIFIED POSTPROCEDURAL STATES: Chronic | ICD-10-CM

## 2024-03-01 LAB
ALBUMIN SERPL ELPH-MCNC: 3.9 G/DL — SIGNIFICANT CHANGE UP (ref 3.3–5)
ALP SERPL-CCNC: 112 U/L — SIGNIFICANT CHANGE UP (ref 40–120)
ALT FLD-CCNC: 22 U/L — SIGNIFICANT CHANGE UP (ref 4–33)
ANION GAP SERPL CALC-SCNC: 14 MMOL/L — SIGNIFICANT CHANGE UP (ref 7–14)
AST SERPL-CCNC: 22 U/L — SIGNIFICANT CHANGE UP (ref 4–32)
BASOPHILS # BLD AUTO: 0 K/UL — SIGNIFICANT CHANGE UP (ref 0–0.2)
BASOPHILS NFR BLD AUTO: 0 % — SIGNIFICANT CHANGE UP (ref 0–2)
BILIRUB SERPL-MCNC: 0.5 MG/DL — SIGNIFICANT CHANGE UP (ref 0.2–1.2)
BUN SERPL-MCNC: 12 MG/DL — SIGNIFICANT CHANGE UP (ref 7–23)
CALCIUM SERPL-MCNC: 9.5 MG/DL — SIGNIFICANT CHANGE UP (ref 8.4–10.5)
CHLORIDE SERPL-SCNC: 100 MMOL/L — SIGNIFICANT CHANGE UP (ref 98–107)
CO2 SERPL-SCNC: 23 MMOL/L — SIGNIFICANT CHANGE UP (ref 22–31)
CREAT SERPL-MCNC: 0.79 MG/DL — SIGNIFICANT CHANGE UP (ref 0.5–1.3)
EGFR: 93 ML/MIN/1.73M2 — SIGNIFICANT CHANGE UP
EOSINOPHIL # BLD AUTO: 0 K/UL — SIGNIFICANT CHANGE UP (ref 0–0.5)
EOSINOPHIL NFR BLD AUTO: 0 % — SIGNIFICANT CHANGE UP (ref 0–6)
GLUCOSE SERPL-MCNC: 116 MG/DL — HIGH (ref 70–99)
HCG SERPL-ACNC: <1 MIU/ML — SIGNIFICANT CHANGE UP
HCT VFR BLD CALC: 40.6 % — SIGNIFICANT CHANGE UP (ref 34.5–45)
HGB BLD-MCNC: 13.2 G/DL — SIGNIFICANT CHANGE UP (ref 11.5–15.5)
IANC: 12.97 K/UL — HIGH (ref 1.8–7.4)
LYMPHOCYTES # BLD AUTO: 1.77 K/UL — SIGNIFICANT CHANGE UP (ref 1–3.3)
LYMPHOCYTES # BLD AUTO: 10.5 % — LOW (ref 13–44)
MANUAL SMEAR VERIFICATION: SIGNIFICANT CHANGE UP
MCHC RBC-ENTMCNC: 27.6 PG — SIGNIFICANT CHANGE UP (ref 27–34)
MCHC RBC-ENTMCNC: 32.5 GM/DL — SIGNIFICANT CHANGE UP (ref 32–36)
MCV RBC AUTO: 84.8 FL — SIGNIFICANT CHANGE UP (ref 80–100)
MONOCYTES # BLD AUTO: 1.48 K/UL — HIGH (ref 0–0.9)
MONOCYTES NFR BLD AUTO: 8.8 % — SIGNIFICANT CHANGE UP (ref 2–14)
NEUTROPHILS # BLD AUTO: 12.87 K/UL — HIGH (ref 1.8–7.4)
NEUTROPHILS NFR BLD AUTO: 75.4 % — SIGNIFICANT CHANGE UP (ref 43–77)
NEUTS BAND # BLD: 0.9 % — SIGNIFICANT CHANGE UP (ref 0–6)
PLAT MORPH BLD: NORMAL — SIGNIFICANT CHANGE UP
PLATELET # BLD AUTO: 235 K/UL — SIGNIFICANT CHANGE UP (ref 150–400)
PLATELET COUNT - ESTIMATE: NORMAL — SIGNIFICANT CHANGE UP
POLYCHROMASIA BLD QL SMEAR: SLIGHT — SIGNIFICANT CHANGE UP
POTASSIUM SERPL-MCNC: 3.7 MMOL/L — SIGNIFICANT CHANGE UP (ref 3.5–5.3)
POTASSIUM SERPL-SCNC: 3.7 MMOL/L — SIGNIFICANT CHANGE UP (ref 3.5–5.3)
PROT SERPL-MCNC: 8.3 G/DL — SIGNIFICANT CHANGE UP (ref 6–8.3)
RBC # BLD: 4.79 M/UL — SIGNIFICANT CHANGE UP (ref 3.8–5.2)
RBC # FLD: 12.8 % — SIGNIFICANT CHANGE UP (ref 10.3–14.5)
RBC BLD AUTO: NORMAL — SIGNIFICANT CHANGE UP
SODIUM SERPL-SCNC: 137 MMOL/L — SIGNIFICANT CHANGE UP (ref 135–145)
VARIANT LYMPHS # BLD: 4.4 % — SIGNIFICANT CHANGE UP (ref 0–6)
WBC # BLD: 16.87 K/UL — HIGH (ref 3.8–10.5)
WBC # FLD AUTO: 16.87 K/UL — HIGH (ref 3.8–10.5)

## 2024-03-01 PROCEDURE — 71046 X-RAY EXAM CHEST 2 VIEWS: CPT | Mod: 26

## 2024-03-01 PROCEDURE — 99284 EMERGENCY DEPT VISIT MOD MDM: CPT

## 2024-03-01 RX ORDER — SODIUM CHLORIDE 9 MG/ML
1000 INJECTION INTRAMUSCULAR; INTRAVENOUS; SUBCUTANEOUS ONCE
Refills: 0 | Status: COMPLETED | OUTPATIENT
Start: 2024-03-01 | End: 2024-03-01

## 2024-03-01 RX ADMIN — SODIUM CHLORIDE 1000 MILLILITER(S): 9 INJECTION INTRAMUSCULAR; INTRAVENOUS; SUBCUTANEOUS at 10:12

## 2024-03-01 NOTE — ED PROVIDER NOTE - ATTENDING CONTRIBUTION TO CARE
I performed a history and physical exam of the patient and discussed their management with the resident/fellow/ACP/student. I have reviewed the resident/fellow/ACP/student note and agree with the documented findings and plan of care, except as noted. I have personally performed a substantive portion of the visit including all aspects of the medical decision making. My medical decision making and observations are found above. Please refer to any progress notes for updates on clinical course.     48 y/o female with PMHx of HTN and Obesity s/p gastric sleeve presenting with viral-like symptoms.  Patient recently tested positive for both flu A and B. Symptoms started on Monday with tactile fever/chills, fever Tmax of 104 on Tuesday, prescribed Tamiflu by PCP for the past 3 days.  Patient reports persistent fever/chills, body aches, headache, intermittent diarrhea with no black/bloody stools with no associated N/V, rashes, dysuria, hematuria, chest pain, shortness of breath, or difficulty breathing.  Patient reports she has not been eating for the past 4 days because she thought this would help get rid of the fluid faster but has appetite denies any weight loss.    Gen: Elevated BMI, NAD, comfortable appearing, afebrile, hemodynamically stable   HEENT: No nasal discharge, mucous membranes mildly dry   CV: RRR, +S1/S2, no M/R/G, equal b/l radial pulses 2+  Resp: CTAB, no W/R/R, SPO2 >95% on RA, no increased WOB   GI: Abdomen soft non-distended, NTTP, no masses/organomegaly   MSK/Skin: No CVA tenderness, no open wounds, no bruising, no rashes   Neuro: A&Ox4, moving all 4 extremities spontaneously, gross sensation intact in UE and LE BL  Psych: appropriate mood    MDM:   48 y/o female with PMHx of HTN and Obesity s/p gastric sleeve presenting with viral-like symptoms, recently tested positive for both flu A and B, persistent fevers/chills/bodyaches, No chest pain, no increased work of breathing/hypoxia, afebrile, hemodynamically stable, nontoxic-appearing.  Exam/history concerning for but not limited to persistent viral syndrome versus low suspicion of superimposed bacterial pneumonia.  Will evaluate with chest x-ray.  Will also obtain basic labs given patient has not been eating for past 4 days voluntarily for low suspicion of metabolic derangement, fluid bolus and reassess

## 2024-03-01 NOTE — ED PROVIDER NOTE - PATIENT PORTAL LINK FT
You can access the FollowMyHealth Patient Portal offered by [facility] by registering at the following website: [email or web address]. By joining [employer]’s FollowMyHealth portal, you will also be able to view your health information using other applications (apps) compatible with our system.

## 2024-03-01 NOTE — ED PROVIDER NOTE - PROGRESS NOTE DETAILS
Pee Attending  Leukocytosis noted on blood work, labs nonactionable, patient does not display any severe signs of infection, well/nontoxic-appearing with no increased work of breathing/hypoxia, abdomen soft/NTTP, chest x-ray clear. Patient DC'd with return precautions

## 2024-03-01 NOTE — ED ADULT NURSE NOTE - CHIEF COMPLAINT QUOTE
Patient presents to ER c/o fevers, chills, body aches for 3 days. Tested positive for Flu A and B. Taking Tylenol and Tamiflu at home phx gastric sleeve

## 2024-03-01 NOTE — ED ADULT NURSE NOTE - OBJECTIVE STATEMENT
Pt awake and alert, no pertinent Phx presenting to ED for fevers, body aches and lightheadedness. Pt tested + for both flu a and b 4 days ago. Stared on Tamiflu and taking Tylenol regularly for fevers.  Pt states lightheadedness started last night. Pt appears well in ED. IV placed, labs sent. Awaiting results and disposition.

## 2024-03-01 NOTE — ED ADULT TRIAGE NOTE - PAIN: PRESENCE, MLM
body aches/complains of pain/discomfort 56 yo F, from home, ambulated with cane , PMHx of obesity, asthma, chronic bronchitis, NAKUL, HTN, DM, HLD, fibromyalgia, anxiety, trigeminal neuralgia, degenerative joint disease, ?IgA vasculitis, psoriasis, OA came with chief complain of chest pain and dysuria. Nephrology consult called for significant FERNIE    1) Non oliguric/Oliguric FERNIE likely dehydration/poor oral intake vs ATN from renal hypoperfusion/sepsis on superimposed hypertensive/diabetic nephropathy/diuretic/ACEI use  2) Hyperkalemia with K level 5.3  3) Hypertension  4) Diabetic Nephropathy with protenuria/neuropathy  5) Morbid Obesity  6) Abdominal wall cellulitis  7) AMS with hypercapnic respiratory failure  8) Anemia of chronic illness 11.4  9) Left renal lesion chronic    Recommend:  Strict I/o  Avoid Nephrotoxic agents  Continue hold ACEI/ARB, diuretic therapy  Maintain MAP>65-70 mm Hg  Adjust antibiotics as per renal dose adjustment  Urine electrolytes   Bustamante's catheter for strict I/o  Monitor BMP and electrolytes every 12 hrly  Pulmonary/ICU consult discussed with primary team due to encephalopathy and hypercarbia for BIPAP/Ventilatory support  Treatment of sepsis/cellulitis per primary/ID team with renal dose adjustment of Abx  No urgent need for RRT/HD  Will follow

## 2024-03-15 ENCOUNTER — APPOINTMENT (OUTPATIENT)
Dept: BARIATRICS | Facility: CLINIC | Age: 49
End: 2024-03-15

## 2024-03-19 ENCOUNTER — APPOINTMENT (OUTPATIENT)
Dept: PLASTIC SURGERY | Facility: CLINIC | Age: 49
End: 2024-03-19

## 2024-04-12 ENCOUNTER — NON-APPOINTMENT (OUTPATIENT)
Age: 49
End: 2024-04-12

## 2024-05-13 ENCOUNTER — APPOINTMENT (OUTPATIENT)
Dept: SURGERY | Facility: CLINIC | Age: 49
End: 2024-05-13

## 2024-06-18 ENCOUNTER — APPOINTMENT (OUTPATIENT)
Dept: PLASTIC SURGERY | Facility: CLINIC | Age: 49
End: 2024-06-18
Payer: MEDICARE

## 2024-06-18 VITALS
HEIGHT: 65 IN | RESPIRATION RATE: 16 BRPM | BODY MASS INDEX: 48.82 KG/M2 | HEART RATE: 79 BPM | DIASTOLIC BLOOD PRESSURE: 83 MMHG | OXYGEN SATURATION: 96 % | WEIGHT: 293 LBS | SYSTOLIC BLOOD PRESSURE: 128 MMHG

## 2024-06-18 DIAGNOSIS — M79.3 PANNICULITIS, UNSPECIFIED: ICD-10-CM

## 2024-06-18 DIAGNOSIS — R63.4 ABNORMAL WEIGHT LOSS: ICD-10-CM

## 2024-06-18 DIAGNOSIS — E65 LOCALIZED ADIPOSITY: ICD-10-CM

## 2024-06-18 PROCEDURE — 99203 OFFICE O/P NEW LOW 30 MIN: CPT

## 2024-06-21 ENCOUNTER — RESULT REVIEW (OUTPATIENT)
Age: 49
End: 2024-06-21

## 2024-06-23 LAB
25(OH)D3 SERPL-MCNC: 23.5 NG/ML
ALBUMIN SERPL ELPH-MCNC: 4.3 G/DL
ALP BLD-CCNC: 96 U/L
ALT SERPL-CCNC: 13 U/L
ANION GAP SERPL CALC-SCNC: 12 MMOL/L
AST SERPL-CCNC: 13 U/L
BASOPHILS # BLD AUTO: 0.1 K/UL
BASOPHILS NFR BLD AUTO: 0.9 %
BILIRUB SERPL-MCNC: <0.2 MG/DL
BUN SERPL-MCNC: 14 MG/DL
CALCIUM SERPL-MCNC: 9.5 MG/DL
CHLORIDE SERPL-SCNC: 103 MMOL/L
CO2 SERPL-SCNC: 25 MMOL/L
CREAT SERPL-MCNC: 0.81 MG/DL
EGFR: 90 ML/MIN/1.73M2
EOSINOPHIL # BLD AUTO: 0.19 K/UL
EOSINOPHIL NFR BLD AUTO: 1.6 %
ESTIMATED AVERAGE GLUCOSE: 114 MG/DL
FERRITIN SERPL-MCNC: 39 NG/ML
FOLATE SERPL-MCNC: 5 NG/ML
GLUCOSE SERPL-MCNC: 110 MG/DL
HBA1C MFR BLD HPLC: 5.6 %
HCT VFR BLD CALC: 41.1 %
HGB BLD-MCNC: 12.6 G/DL
IMM GRANULOCYTES NFR BLD AUTO: 0.3 %
IRON SATN MFR SERPL: 20 %
IRON SERPL-MCNC: 63 UG/DL
LYMPHOCYTES # BLD AUTO: 2.86 K/UL
LYMPHOCYTES NFR BLD AUTO: 24.3 %
MAN DIFF?: NORMAL
MCHC RBC-ENTMCNC: 27.5 PG
MCHC RBC-ENTMCNC: 30.7 GM/DL
MCV RBC AUTO: 89.5 FL
MONOCYTES # BLD AUTO: 0.71 K/UL
MONOCYTES NFR BLD AUTO: 6 %
NEUTROPHILS # BLD AUTO: 7.86 K/UL
NEUTROPHILS NFR BLD AUTO: 66.9 %
PLATELET # BLD AUTO: 343 K/UL
POTASSIUM SERPL-SCNC: 4.1 MMOL/L
PROT SERPL-MCNC: 7.3 G/DL
RBC # BLD: 4.59 M/UL
RBC # FLD: 13.1 %
SODIUM SERPL-SCNC: 140 MMOL/L
TIBC SERPL-MCNC: 321 UG/DL
UIBC SERPL-MCNC: 258 UG/DL
VIT B12 SERPL-MCNC: 427 PG/ML
WBC # FLD AUTO: 11.76 K/UL

## 2024-06-24 ENCOUNTER — APPOINTMENT (OUTPATIENT)
Dept: CT IMAGING | Facility: CLINIC | Age: 49
End: 2024-06-24
Payer: MEDICARE

## 2024-06-24 ENCOUNTER — APPOINTMENT (OUTPATIENT)
Dept: SURGERY | Facility: CLINIC | Age: 49
End: 2024-06-24
Payer: MEDICARE

## 2024-06-24 VITALS
BODY MASS INDEX: 48.82 KG/M2 | HEART RATE: 74 BPM | DIASTOLIC BLOOD PRESSURE: 85 MMHG | RESPIRATION RATE: 14 BRPM | TEMPERATURE: 98 F | HEIGHT: 65 IN | OXYGEN SATURATION: 98 % | WEIGHT: 293 LBS | SYSTOLIC BLOOD PRESSURE: 134 MMHG

## 2024-06-24 DIAGNOSIS — R63.5 ABNORMAL WEIGHT GAIN: ICD-10-CM

## 2024-06-24 DIAGNOSIS — Z98.84 BARIATRIC SURGERY STATUS: ICD-10-CM

## 2024-06-24 PROCEDURE — 99213 OFFICE O/P EST LOW 20 MIN: CPT

## 2024-06-24 PROCEDURE — 74176 CT ABD & PELVIS W/O CONTRAST: CPT | Mod: 26

## 2024-06-24 NOTE — ADDENDUM
[FreeTextEntry1] :  I, Raúl Garcia, documented this note as a scribe on behalf of Dr. Lauren Shikowitz-Behr, MD on 06/18/2024.

## 2024-06-24 NOTE — CONSULT LETTER
[Dear  ___] : Dear  [unfilled], [Consult Letter:] : I had the pleasure of evaluating your patient, [unfilled]. [Please see my note below.] : Please see my note below. [Consult Closing:] : Thank you very much for allowing me to participate in the care of this patient.  If you have any questions, please do not hesitate to contact me. [Sincerely,] : Sincerely, [FreeTextEntry2] : Dr. Prem Love [FreeTextEntry3] : Dr. Lauren Shikowitz-Behr, MD  Board Certified Plastic and Reconstructive Surgeon [facility] Associate Chief of Surgery, [facility]  in Plastic Surgery, [employer]  [address], [address] [place] [address]  [address], [address] [place] [address]  [phone]

## 2024-06-24 NOTE — ASSESSMENT
[FreeTextEntry1] : 48-year-old female status post laparoscopic sleeve gastrectomy and cholecystectomy on 1/17/2023. She has struggled recently with weight regain and has been eating higher calorie foods.  Preoperative weight 379 pounds, BMI 63 Lowest weight 275 pounds, BMI 45 Current weight 299 pounds, BMI 49.8

## 2024-06-24 NOTE — END OF VISIT
[FreeTextEntry3] : All medical record entries made by the Scribe were at my, Dr. Lauren Shikowitz-Behr, MD, direction and personally dictated by me on 06/18/2024. I have reviewed the chart and agree that the record accurately reflects my personal performance of the history, physical exam, assessment and plan. I have also personally directed, reviewed, and agreed with the chart.

## 2024-06-24 NOTE — PHYSICAL EXAM
[NI] : Normal [de-identified] : NAD, AxOx3  [de-identified] : nonlabored breathing  [de-identified] : normal HR [de-identified] : no edema  [de-identified] : Large abdominal pannus extended past the mons pubis  Striae is present throughout Redundant mons rash/irritation on exam  No appreciable abdominal hernias  supraumbilical skin roll [de-identified] : as above [de-identified] : grossly intact [de-identified] : normal affect

## 2024-06-24 NOTE — PHYSICAL EXAM
[Obese, well nourished, in no acute distress] : obese, well nourished, in no acute distress [Normal] : affect appropriate [de-identified] : Normal respirations [de-identified] : Soft, nontender, nondistended.  Incisions well-healed.

## 2024-06-24 NOTE — HISTORY OF PRESENT ILLNESS
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 The patient has been struggling to keep her weight down, and is up 15 pounds from her last visit in February, 4 months ago.  She admits that her appetite is increasing, but mostly she has been making unhealthy food choices, and eating out almost every single day.  While she has cut down on her sugar intake, and her hemoglobin A1c is now normal at 5.6, her intake of other unhealthy foods has increased.  She otherwise reports no abdominal pain, reflux, or dysphagia.

## 2024-06-24 NOTE — PLAN
[FreeTextEntry1] : [] Reviewed appropriate post bariatric diet and referred to dietitian [] Advised to try prepping and planning meals at home, avoid highly processed foods, eat more vegetables and lean proteins [] goal 30 min cardiovascular exercise daily, with some weight resistance training as tolerated [] continue multivitamins [] f/u 3 months

## 2024-06-24 NOTE — HISTORY OF PRESENT ILLNESS
[FreeTextEntry1] : SYBIL TSANG is a 48 year female with history of significant weight loss following bariatric surgery. Patient complaining of excess skin to the abdomen and pubic area. Patient states that her pannus interferes with exercise and there is difficulty properly fitting into clothing. Sybil states her pannus is "uncomfortable". She  also is complaining of rashes/irritation to beneath her pannus for which she has attempted to use good hygiene, powders, corn starch and prescription anti-fungal creams and dressings, but without any resolution. Heaviest weight was 400lbs . Current weight is 300lbs Patient has been weight stable for at least 6 months  PMHx- denies PSHx-Laparoscopic sleeve gastrectomy 1/17/2023, cholecystectomy and D&C Allergies-codeine, dramamine Denies tobacco use Work history- [employer]

## 2024-06-26 LAB
VIT B1 SERPL-MCNC: 148.3 NMOL/L
VIT B6 SERPL-MCNC: 5.5 UG/L

## 2024-07-02 ENCOUNTER — APPOINTMENT (OUTPATIENT)
Dept: SURGERY | Facility: CLINIC | Age: 49
End: 2024-07-02
Payer: MEDICARE

## 2024-07-02 VITALS
TEMPERATURE: 97.6 F | BODY MASS INDEX: 48.82 KG/M2 | HEART RATE: 76 BPM | WEIGHT: 293 LBS | DIASTOLIC BLOOD PRESSURE: 76 MMHG | RESPIRATION RATE: 16 BRPM | SYSTOLIC BLOOD PRESSURE: 116 MMHG | HEIGHT: 65 IN | OXYGEN SATURATION: 97 %

## 2024-07-02 DIAGNOSIS — K42.9 UMBILICAL HERNIA W/OUT OBSTRUCTION OR GANGRENE: ICD-10-CM

## 2024-07-02 DIAGNOSIS — K43.9 VENTRAL HERNIA W/OUT OBSTRUCTION OR GANGRENE: ICD-10-CM

## 2024-07-02 PROCEDURE — 99215 OFFICE O/P EST HI 40 MIN: CPT

## 2024-08-19 ENCOUNTER — TRANSCRIPTION ENCOUNTER (OUTPATIENT)
Age: 49
End: 2024-08-19

## 2024-09-04 ENCOUNTER — APPOINTMENT (OUTPATIENT)
Age: 49
End: 2024-09-04

## 2024-09-10 ENCOUNTER — APPOINTMENT (OUTPATIENT)
Dept: PLASTIC SURGERY | Facility: CLINIC | Age: 49
End: 2024-09-10

## 2024-09-10 DIAGNOSIS — Z98.84 BARIATRIC SURGERY STATUS: ICD-10-CM

## 2024-09-10 DIAGNOSIS — E65 LOCALIZED ADIPOSITY: ICD-10-CM

## 2024-09-10 DIAGNOSIS — M79.3 PANNICULITIS, UNSPECIFIED: ICD-10-CM

## 2024-09-10 DIAGNOSIS — R63.4 ABNORMAL WEIGHT LOSS: ICD-10-CM

## 2024-09-10 PROCEDURE — 99212 OFFICE O/P EST SF 10 MIN: CPT

## 2024-09-13 NOTE — ED ADULT TRIAGE NOTE - CHIEF COMPLAINT QUOTE
[facility] [employer]
Patient presents to ER c/o fevers, chills, body aches for 3 days. Tested positive for Flu A and B. Taking Tylenol and Tamiflu at home phx gastric sleeve

## 2024-09-19 ENCOUNTER — APPOINTMENT (OUTPATIENT)
Age: 49
End: 2024-09-19

## 2024-09-20 NOTE — END OF VISIT
[FreeTextEntry3] : All medical record entries made by the Scribe were at my, Dr. Lauren Shikowitz-Behr, MD, direction and personally dictated by me on 09/10/2024. I have reviewed the chart and agree that the record accurately reflects my personal performance of the history, physical exam, assessment and plan. I have also personally directed, reviewed, and agreed with the chart. [Time Spent: ___ minutes] : I have spent [unfilled] minutes of time on the encounter which excludes teaching and separately reported services.

## 2024-09-20 NOTE — PHYSICAL EXAM
[NI] : Normal [de-identified] : NAD, AxOx3  [de-identified] : nonlabored breathing  [de-identified] : normal HR [de-identified] : Large abdominal pannus extended past the mons pubis  Striae is present throughout Redundant mons rash/irritation on exam  No appreciable abdominal hernias  supraumbilical skin roll [de-identified] : no edema  [de-identified] : as above [de-identified] : grossly intact [de-identified] : normal affect

## 2024-09-20 NOTE — ADDENDUM
[FreeTextEntry1] :  I, Raúl Garcia, documented this note as a scribe on behalf of Dr. Lauren Shikowitz-Behr, MD on 09/10/2024.

## 2024-09-20 NOTE — HISTORY OF PRESENT ILLNESS
[FreeTextEntry1] : SYBIL TSANG is a 48 year female with history of significant weight loss following bariatric surgery. Patient complaining of excess skin to the abdomen and pubic area. Patient states that her pannus interferes with exercise and there is difficulty properly fitting into clothing. Sybil states her pannus is "uncomfortable". She  also is complaining of rashes/irritation to beneath her pannus for which she has attempted to use good hygiene, powders, corn starch and prescription anti-fungal creams and dressings, but without any resolution. Heaviest weight was 400lbs . Current weight is 300lbs Patient has been weight stable for at least 6 months  PMHx- denies PSHx-Laparoscopic sleeve gastrectomy 1/17/2023, cholecystectomy and D&C Allergies-codeine, dramamine Denies tobacco use Work history- [employer]    INTERVAL HISTORY  Patient presents today for pre-operative discussion. She is scheduled for Panniculectomy with umbilical hernia repair with Dr. Mcintyre. Surgical date is pending.  Patient with no new complaints today.

## 2024-09-20 NOTE — PHYSICAL EXAM
[NI] : Normal [de-identified] : NAD, AxOx3  [de-identified] : nonlabored breathing  [de-identified] : normal HR [de-identified] : Large abdominal pannus extended past the mons pubis  Striae is present throughout Redundant mons rash/irritation on exam  No appreciable abdominal hernias  supraumbilical skin roll [de-identified] : no edema  [de-identified] : as above [de-identified] : grossly intact [de-identified] : normal affect

## 2024-09-21 NOTE — PRE-OP CHECKLIST - WARM FLUIDS/WARM BLANKETS
[FreeTextEntry1] : Patient is diagnosed with upper respiratory infection/pharyngitis/sinusitis. And was advised to use symptomatic relief, which may include nasal  saline, cool mist humidifier and over-the-counter products  as needed.  Rapid strep negative Patient was prescribed appropriate antibiotic and was advised to continue a full course of therapy.  Supportive care for upper respiratory nasal saline as needed Patient may use Tylenol or Motrin p.r.n. pain or fever. Total time dedicated to this patient visit , including preparing to see the patient ( eg.. Review of chart, any pertinent labs ect  ) obtaining and/ or  reviewing separately obtained history, performing medical exam,  evaluation, counseling and educating patient and family member, ordering any needed medication or labs and documenting clinical information in  the electronic medical record to patient / parent ___30___ minutes.  Patient is advised to follow up if symptoms do not improve in 2-3 days. no

## 2025-02-10 NOTE — PATIENT PROFILE ADULT - FUNCTIONAL ASSESSMENT - BASIC MOBILITY 3.
Please bring all medication bottles to EVERY appointment.  Thank you!  Take your blood pressure using your home monitor on the morning of your next appointment and bring the monitor with you.     4 = No assist / stand by assistance

## 2025-06-20 NOTE — PATIENT PROFILE ADULT - NS PRO AD NO ADVANCE DIRECTIVE
Seen and examined, have discussed plan of care with PA.   I agree with note as stated above, having amended the EMR as needed to reflect the findings. No

## (undated) DEVICE — LIGASURE MARYLAND 44CM

## (undated) DEVICE — GLV 7 PROTEXIS (WHITE)

## (undated) DEVICE — STOPCOCK 4-WAY W SWIVEL MALE LUER LOCK NON VENTED RED CAP

## (undated) DEVICE — DRSG MASTISOL

## (undated) DEVICE — IRRIGATION TRAY W PISTON SYRINGE 60ML

## (undated) DEVICE — SYR LUER LOK 50CC

## (undated) DEVICE — TUBING TRUWAVE PRESSURE MALE/FEMALE 72"

## (undated) DEVICE — GOWN XL EXTRA LONG

## (undated) DEVICE — D HELP - CLEARVIEW CLEARIFY SYSTEM

## (undated) DEVICE — SUT POLYSORB 0 60" TIES UNDYED

## (undated) DEVICE — GLV 6.5 PROTEXIS (WHITE)

## (undated) DEVICE — FORCEP RADIAL JAW 4 JUMBO 2.8MM 3.2MM 240CM ORANGE DISP

## (undated) DEVICE — DRAPE TOWEL BLUE 17" X 24"

## (undated) DEVICE — WARMING BLANKET UPPER ADULT

## (undated) DEVICE — TUBING INSUFFLATION LAP FILTER 10FT

## (undated) DEVICE — TUBING PLUME AWAY 4.0

## (undated) DEVICE — SENSOR O2 FINGER ADULT

## (undated) DEVICE — MEDICATION LABELS W MARKER

## (undated) DEVICE — POSITIONER FOAM EGG CRATE ULNAR 2PCS (PINK)

## (undated) DEVICE — TROCAR COVIDIEN VERSAPORT BLADELESS OPTICAL 12MM STANDARD

## (undated) DEVICE — TROCAR COVIDIEN VERSAPORT BLADELESS OPTICAL 15MM STANDARD

## (undated) DEVICE — FOLEY HOLDER STATLOCK 2 WAY ADULT

## (undated) DEVICE — BRUSH COLONOSCOPY CYTOLOGY

## (undated) DEVICE — PREP CHLORAPREP HI-LITE ORANGE 26ML

## (undated) DEVICE — DRSG STERISTRIPS 0.5 X 4"

## (undated) DEVICE — CLAMP BX HOT RAD JAW 3

## (undated) DEVICE — GLV 7.5 PROTEXIS (WHITE)

## (undated) DEVICE — SUT PDS II 0 18" ENDOLOOP LIGATURE

## (undated) DEVICE — SUCTION YANKAUER NO CONTROL VENT

## (undated) DEVICE — SHEARS COVIDIEN ENDO SHEAR 5MM X 31CM W UNIPOLAR CAUTERY

## (undated) DEVICE — ELCTR LAPAROSCOPIC WIRE L HOOK 36CM

## (undated) DEVICE — GLV 8 PROTEXIS (WHITE)

## (undated) DEVICE — BIOPSY FORCEP RADIAL JAW 4 STANDARD WITH NEEDLE

## (undated) DEVICE — CATH IV SAFE BC 20G X 1.16" (PINK)

## (undated) DEVICE — GLV 7 PROTEXIS (BLUE)

## (undated) DEVICE — TUBING SUCTION 20FT

## (undated) DEVICE — PACK IV START WITH CHG

## (undated) DEVICE — INSUFFLATION NDL COVIDIEN STEP 14G FOR STEP/VERSASTEP

## (undated) DEVICE — COVIDIEN SIGNIA POWER CONTROL SHELL

## (undated) DEVICE — GOWN TRIMAX LG

## (undated) DEVICE — ENDOCATCH 10MM SPECIMEN POUCH

## (undated) DEVICE — TAPE SILK 3"

## (undated) DEVICE — SUT MONOCRYL 4-0 18" PS-2

## (undated) DEVICE — GLV 8.5 PROTEXIS (WHITE)

## (undated) DEVICE — TUBING STRYKEFLOW II SUCTION / IRRIGATOR

## (undated) DEVICE — SOL IRR POUR NS 0.9% 500ML

## (undated) DEVICE — CATH IV SAFE BC 22G X 1" (BLUE)

## (undated) DEVICE — TUBING IV SET GRAVITY 3Y 100" MACRO

## (undated) DEVICE — TUBING HYBRID CO2

## (undated) DEVICE — PACK ADVANCED LAPAROSCOPIC NS

## (undated) DEVICE — SPECIMEN CONTAINER 100ML

## (undated) DEVICE — VISIGI 3D SLEEVE GASTRECTOMY 36FR

## (undated) DEVICE — BITE BLOCK ADULT 20 X 27MM (GREEN)

## (undated) DEVICE — PACK LAP CHOLE LAP APPENDECTOMY

## (undated) DEVICE — DRSG OPSITE 2.5 X 2"

## (undated) DEVICE — TROCAR COVIDIEN VERSAPORT BLADELESS OPTICAL 5MM STANDARD

## (undated) DEVICE — BALLOON US ENDO

## (undated) DEVICE — SYR ALLIANCE II INFLATION 60ML

## (undated) DEVICE — TUBING SUCTION CONN 6FT STERILE

## (undated) DEVICE — BLADE SCALPEL SAFETYLOCK #11

## (undated) DEVICE — DRAPE MAYO STAND 30"

## (undated) DEVICE — ELCTR CORD FOOTSWITCH 1PLR LAPSCP 10FT

## (undated) DEVICE — SOL INJ NS 0.9% 500ML 2 PORT

## (undated) DEVICE — IRRIGATOR BIO SHIELD

## (undated) DEVICE — VENODYNE/SCD SLEEVE CALF LARGE

## (undated) DEVICE — SOL IRR POUR H2O 250ML

## (undated) DEVICE — FORCEP MULTIBITE MULTIPLE SAMPLE 2.4MM 2.8MM 240CM ORANGE DISP